# Patient Record
Sex: FEMALE | Race: BLACK OR AFRICAN AMERICAN | Employment: FULL TIME | URBAN - METROPOLITAN AREA
[De-identification: names, ages, dates, MRNs, and addresses within clinical notes are randomized per-mention and may not be internally consistent; named-entity substitution may affect disease eponyms.]

---

## 2017-02-20 ENCOUNTER — APPOINTMENT (OUTPATIENT)
Dept: GENERAL RADIOLOGY | Age: 52
End: 2017-02-20
Attending: EMERGENCY MEDICINE
Payer: COMMERCIAL

## 2017-02-20 ENCOUNTER — HOSPITAL ENCOUNTER (EMERGENCY)
Age: 52
Discharge: HOME | End: 2017-02-20
Attending: EMERGENCY MEDICINE
Payer: COMMERCIAL

## 2017-02-20 VITALS
SYSTOLIC BLOOD PRESSURE: 122 MMHG | RESPIRATION RATE: 18 BRPM | HEART RATE: 87 BPM | BODY MASS INDEX: 36.92 KG/M2 | DIASTOLIC BLOOD PRESSURE: 77 MMHG | OXYGEN SATURATION: 98 % | WEIGHT: 250 LBS | TEMPERATURE: 99.7 F

## 2017-02-20 DIAGNOSIS — J20.9 ACUTE BRONCHITIS, UNSPECIFIED ORGANISM: ICD-10-CM

## 2017-02-20 DIAGNOSIS — J06.9 ACUTE URI: Primary | ICD-10-CM

## 2017-02-20 DIAGNOSIS — R07.89 ACUTE CHEST WALL PAIN: ICD-10-CM

## 2017-02-20 LAB
ANION GAP BLD CALC-SCNC: 7 MMOL/L (ref 3–18)
ATRIAL RATE: 115 BPM
BASOPHILS # BLD AUTO: 0 K/UL (ref 0–0.1)
BASOPHILS # BLD: 0 % (ref 0–2)
BUN SERPL-MCNC: 7 MG/DL (ref 7–18)
BUN/CREAT SERPL: 8 (ref 12–20)
CALCIUM SERPL-MCNC: 9.2 MG/DL (ref 8.5–10.1)
CALCULATED P AXIS, ECG09: 49 DEGREES
CALCULATED R AXIS, ECG10: -41 DEGREES
CALCULATED T AXIS, ECG11: 46 DEGREES
CHLORIDE SERPL-SCNC: 98 MMOL/L (ref 100–108)
CO2 SERPL-SCNC: 30 MMOL/L (ref 21–32)
CREAT SERPL-MCNC: 0.84 MG/DL (ref 0.6–1.3)
DIAGNOSIS, 93000: NORMAL
DIFFERENTIAL METHOD BLD: ABNORMAL
EOSINOPHIL # BLD: 0 K/UL (ref 0–0.4)
EOSINOPHIL NFR BLD: 0 % (ref 0–5)
ERYTHROCYTE [DISTWIDTH] IN BLOOD BY AUTOMATED COUNT: 14 % (ref 11.6–14.5)
FLUAV AG NPH QL IA: NEGATIVE
FLUBV AG NOSE QL IA: NEGATIVE
GLUCOSE SERPL-MCNC: 123 MG/DL (ref 74–99)
HCT VFR BLD AUTO: 38.4 % (ref 35–45)
HGB BLD-MCNC: 12.5 G/DL (ref 12–16)
LYMPHOCYTES # BLD AUTO: 17 % (ref 21–52)
LYMPHOCYTES # BLD: 2.4 K/UL (ref 0.9–3.6)
MAGNESIUM SERPL-MCNC: 2 MG/DL (ref 1.8–2.4)
MCH RBC QN AUTO: 27.4 PG (ref 24–34)
MCHC RBC AUTO-ENTMCNC: 32.6 G/DL (ref 31–37)
MCV RBC AUTO: 84 FL (ref 74–97)
MONOCYTES # BLD: 0.9 K/UL (ref 0.05–1.2)
MONOCYTES NFR BLD AUTO: 6 % (ref 3–10)
NEUTS SEG # BLD: 11.2 K/UL (ref 1.8–8)
NEUTS SEG NFR BLD AUTO: 77 % (ref 40–73)
P-R INTERVAL, ECG05: 120 MS
PLATELET # BLD AUTO: 220 K/UL (ref 135–420)
PMV BLD AUTO: 10.9 FL (ref 9.2–11.8)
POTASSIUM SERPL-SCNC: 3.3 MMOL/L (ref 3.5–5.5)
Q-T INTERVAL, ECG07: 336 MS
QRS DURATION, ECG06: 80 MS
QTC CALCULATION (BEZET), ECG08: 464 MS
RBC # BLD AUTO: 4.57 M/UL (ref 4.2–5.3)
SODIUM SERPL-SCNC: 135 MMOL/L (ref 136–145)
VENTRICULAR RATE, ECG03: 115 BPM
WBC # BLD AUTO: 14.5 K/UL (ref 4.6–13.2)

## 2017-02-20 PROCEDURE — 80048 BASIC METABOLIC PNL TOTAL CA: CPT | Performed by: EMERGENCY MEDICINE

## 2017-02-20 PROCEDURE — 96361 HYDRATE IV INFUSION ADD-ON: CPT

## 2017-02-20 PROCEDURE — 74011250637 HC RX REV CODE- 250/637: Performed by: EMERGENCY MEDICINE

## 2017-02-20 PROCEDURE — 71020 XR CHEST PA LAT: CPT

## 2017-02-20 PROCEDURE — 93005 ELECTROCARDIOGRAM TRACING: CPT

## 2017-02-20 PROCEDURE — 96374 THER/PROPH/DIAG INJ IV PUSH: CPT

## 2017-02-20 PROCEDURE — 83735 ASSAY OF MAGNESIUM: CPT | Performed by: EMERGENCY MEDICINE

## 2017-02-20 PROCEDURE — 87804 INFLUENZA ASSAY W/OPTIC: CPT | Performed by: EMERGENCY MEDICINE

## 2017-02-20 PROCEDURE — 99284 EMERGENCY DEPT VISIT MOD MDM: CPT

## 2017-02-20 PROCEDURE — 74011250636 HC RX REV CODE- 250/636: Performed by: EMERGENCY MEDICINE

## 2017-02-20 PROCEDURE — 85025 COMPLETE CBC W/AUTO DIFF WBC: CPT | Performed by: EMERGENCY MEDICINE

## 2017-02-20 PROCEDURE — 87081 CULTURE SCREEN ONLY: CPT | Performed by: EMERGENCY MEDICINE

## 2017-02-20 RX ORDER — AZITHROMYCIN 250 MG/1
250 TABLET, FILM COATED ORAL SEE ADMIN INSTRUCTIONS
Qty: 6 TAB | Refills: 0 | Status: SHIPPED | OUTPATIENT
Start: 2017-02-20

## 2017-02-20 RX ORDER — KETOROLAC TROMETHAMINE 30 MG/ML
30 INJECTION, SOLUTION INTRAMUSCULAR; INTRAVENOUS
Status: COMPLETED | OUTPATIENT
Start: 2017-02-20 | End: 2017-02-20

## 2017-02-20 RX ORDER — CODEINE PHOSPHATE AND GUAIFENESIN 10; 100 MG/5ML; MG/5ML
5 SOLUTION ORAL
Qty: 120 ML | Refills: 0 | Status: SHIPPED | OUTPATIENT
Start: 2017-02-20

## 2017-02-20 RX ORDER — POTASSIUM CHLORIDE 20 MEQ/1
40 TABLET, EXTENDED RELEASE ORAL
Status: COMPLETED | OUTPATIENT
Start: 2017-02-20 | End: 2017-02-20

## 2017-02-20 RX ADMIN — SODIUM CHLORIDE 500 ML: 900 INJECTION, SOLUTION INTRAVENOUS at 10:33

## 2017-02-20 RX ADMIN — KETOROLAC TROMETHAMINE 30 MG: 30 INJECTION, SOLUTION INTRAMUSCULAR at 10:33

## 2017-02-20 RX ADMIN — POTASSIUM CHLORIDE 40 MEQ: 1500 TABLET, EXTENDED RELEASE ORAL at 12:10

## 2017-02-20 NOTE — LETTER
NOTIFICATION RETURN TO WORK / SCHOOL 
 
2/20/2017 1:08 PM 
 
Ms. Lynn Alas [address] [place] [address] To Whom It May Concern: 
 
Lynn Alas is currently under the care of [facility]. She will return to work/school on: 2/22/2017 If there are questions or concerns please have the patient contact our office.  
 
 
 
Sincerely, 
 
 
Tushar Shaffer RN

## 2017-02-20 NOTE — ED PROVIDER NOTES
HPI Comments: Pt with history of HTN, presents to ED with complaint of productive cough x 1 week with pleuritic anterior chest pain. She denies any fevers or chills, no abdominal pain, no nausea or vomiting, no diaphoresis. She has not discussed symptoms with her PMD yet. She denies any neck or back pain. No personal or significant family history of CAD. No known ill contacts. No other acute symptoms or complaints were noted. No past medical history on file. Past Surgical History:   Procedure Laterality Date    Hx hysterectomy       2007         No family history on file. Social History     Social History    Marital status:      Spouse name: N/A    Number of children: N/A    Years of education: N/A     Occupational History    Not on file. Social History Main Topics    Smoking status: Never Smoker    Smokeless tobacco: Not on file    Alcohol use No    Drug use: No    Sexual activity: Not on file     Other Topics Concern    Not on file     Social History Narrative    No narrative on file         ALLERGIES: Review of patient's allergies indicates no known allergies. Review of Systems   Constitutional: Negative for chills, diaphoresis and fever. HENT: Negative. Eyes: Negative for visual disturbance. Respiratory: Positive for cough and wheezing. Negative for shortness of breath. Cardiovascular: Positive for chest pain. Negative for palpitations and leg swelling. Gastrointestinal: Negative for abdominal pain, diarrhea, nausea and vomiting. Endocrine: Negative. Genitourinary: Negative for dysuria and hematuria. Musculoskeletal: Negative. Negative for back pain, neck pain and neck stiffness. Skin: Negative for pallor. Allergic/Immunologic: Negative. Neurological: Negative for dizziness, syncope, light-headedness and headaches. Hematological: Does not bruise/bleed easily.        Vitals:    02/20/17 0632   BP: 131/80   Pulse: (!) 115   Resp: 20   Temp: 99.7 °F (37.6 °C)   SpO2: 97%   Weight: 113.4 kg (250 lb)            Physical Exam   Constitutional: She is oriented to person, place, and time. She appears well-developed and well-nourished. No distress. Resting comfortably on stretcher   HENT:   Head: Normocephalic and atraumatic. MM moist   Eyes: Conjunctivae and EOM are normal. No scleral icterus. Sclera clear bilaterally   Neck: Normal range of motion. Neck supple. No JVD present. Non-tender to palpation   Cardiovascular: Normal rate, regular rhythm and normal heart sounds. Exam reveals no gallop and no friction rub. No murmur heard. Pulmonary/Chest: Effort normal and breath sounds normal. No respiratory distress. She has no wheezes. She has no rales. She exhibits tenderness. No crepitance with palpation. Reproducible chest wall tenderness with palpation along bilateral sternal borders. Abdominal: Soft. She exhibits no distension. There is no tenderness. Genitourinary:   Genitourinary Comments: No CVA tenderness   Musculoskeletal: She exhibits no edema or tenderness. Normal inspection of upper extremities. No edema noted to bilateral lower extremities   Lymphadenopathy:     She has no cervical adenopathy. Neurological: She is alert and oriented to person, place, and time. No cranial nerve deficit. She exhibits normal muscle tone. Normal motor and sensation bilaterally to upper and lower extremities   Skin: Skin is warm and dry. No rash noted. She is not diaphoretic. Psychiatric:   Normal mood and affect. Vitals reviewed. MDM  Number of Diagnoses or Management Options  Diagnosis management comments: Pt with reproducible chest wall pain with cough, no significant cardiac risk factors or history. Will check labs and XR, treat symptoms but anticipate discharge to home with PMD follow up.        Amount and/or Complexity of Data Reviewed  Clinical lab tests: ordered  Tests in the radiology section of CPT®: ordered  Tests in the medicine section of CPT®: ordered  Decide to obtain previous medical records or to obtain history from someone other than the patient: yes  Obtain history from someone other than the patient: yes  Independent visualization of images, tracings, or specimens: yes    Risk of Complications, Morbidity, and/or Mortality  Presenting problems: moderate  Management options: moderate      ED Course       Procedures    EKG:  NSR, rate 115, LAD, no significant ST-T abnormalities. No old EKG available for comparison.

## 2017-02-20 NOTE — ED NOTES
Assumed care for discharge instructions. Patient is awake/alert x 4 with quiet unlabored respirations, headache 5/10  Prescriptions x 2 and home care instructions were reviewed with patient who verbalized understanding.   Patient was then ambulatory to the exit with an even steady gait

## 2017-02-20 NOTE — DISCHARGE INSTRUCTIONS
Bronchitis: Care Instructions  Your Care Instructions    Bronchitis is inflammation of the bronchial tubes, which carry air to the lungs. The tubes swell and produce mucus, or phlegm. The mucus and inflamed bronchial tubes make you cough. You may have trouble breathing. Most cases of bronchitis are caused by viruses like those that cause colds. Antibiotics usually do not help and they may be harmful. Bronchitis usually develops rapidly and lasts about 2 to 3 weeks in otherwise healthy people. Follow-up care is a key part of your treatment and safety. Be sure to make and go to all appointments, and call your doctor if you are having problems. It's also a good idea to know your test results and keep a list of the medicines you take. How can you care for yourself at home? · Take all medicines exactly as prescribed. Call your doctor if you think you are having a problem with your medicine. · Get some extra rest.  · Take an over-the-counter pain medicine, such as acetaminophen (Tylenol), ibuprofen (Advil, Motrin), or naproxen (Aleve) to reduce fever and relieve body aches. Read and follow all instructions on the label. · Do not take two or more pain medicines at the same time unless the doctor told you to. Many pain medicines have acetaminophen, which is Tylenol. Too much acetaminophen (Tylenol) can be harmful. · Take an over-the-counter cough medicine that contains dextromethorphan to help quiet a dry, hacking cough so that you can sleep. Avoid cough medicines that have more than one active ingredient. Read and follow all instructions on the label. · Breathe moist air from a humidifier, hot shower, or sink filled with hot water. The heat and moisture will thin mucus so you can cough it out. · Do not smoke. Smoking can make bronchitis worse. If you need help quitting, talk to your doctor about stop-smoking programs and medicines. These can increase your chances of quitting for good.   When should you call for help? Call 911 anytime you think you may need emergency care. For example, call if:  · You have severe trouble breathing. Call your doctor now or seek immediate medical care if:  · You have new or worse trouble breathing. · You cough up dark brown or bloody mucus (sputum). · You have a new or higher fever. · You have a new rash. Watch closely for changes in your health, and be sure to contact your doctor if:  · You cough more deeply or more often, especially if you notice more mucus or a change in the color of your mucus. · You are not getting better as expected. Where can you learn more? Go to http://humera-ryder.info/. Enter H333 in the search box to learn more about \"Bronchitis: Care Instructions. \"  Current as of: May 23, 2016  Content Version: 11.1  © 2251-2226 [employer]. Care instructions adapted under license by [employer] (which disclaims liability or warranty for this information). If you have questions about a medical condition or this instruction, always ask your healthcare professional. [place] [address] any warranty or liability for your use of this information. Musculoskeletal Chest Pain: Care Instructions  Your Care Instructions  Chest pain is not always a sign that something is wrong with your heart or that you have another serious problem. The doctor thinks your chest pain is caused by strained muscles or ligaments, inflamed chest cartilage, or another problem in your chest, rather than by your heart. You may need more tests to find the cause of your chest pain. Follow-up care is a key part of your treatment and safety. Be sure to make and go to all appointments, and call your doctor if you are having problems. Its also a good idea to know your test results and keep a list of the medicines you take. How can you care for yourself at home? · Take pain medicines exactly as directed.   ¨ If the doctor gave you a prescription medicine for pain, take it as prescribed. ¨ If you are not taking a prescription pain medicine, ask your doctor if you can take an over-the-counter medicine. · Rest and protect the sore area. · Stop, change, or take a break from any activity that may be causing your pain or soreness. · Put ice or a cold pack on the sore area for 10 to 20 minutes at a time. Try to do this every 1 to 2 hours for the next 3 days (when you are awake) or until the swelling goes down. Put a thin cloth between the ice and your skin. · After 2 or 3 days, apply a heating pad set on low or a warm cloth to the area that hurts. Some doctors suggest that you go back and forth between hot and cold. · Do not wrap or tape your ribs for support. This may cause you to take smaller breaths, which could increase your risk of lung problems. · Mentholated creams such as Bengay or Icy Hot may soothe sore muscles. Follow the instructions on the package. · Follow your doctor's instructions for exercising. · Gentle stretching and massage may help you get better faster. Stretch slowly to the point just before pain begins, and hold the stretch for at least 15 to 30 seconds. Do this 3 or 4 times a day. Stretch just after you have applied heat. · As your pain gets better, slowly return to your normal activities. Any increased pain may be a sign that you need to rest a while longer. When should you call for help? Call 911 anytime you think you may need emergency care. For example, call if:  · You have chest pain or pressure. This may occur with:  ¨ Sweating. ¨ Shortness of breath. ¨ Nausea or vomiting. ¨ Pain that spreads from the chest to the neck, jaw, or one or both shoulders or arms. ¨ Dizziness or lightheadedness. ¨ A fast or uneven pulse. After calling 911, chew 1 adult-strength aspirin. Wait for an ambulance. Do not try to drive yourself. · You have sudden chest pain and shortness of breath, or you cough up blood.   Call your doctor now or seek immediate medical care if:  · You have any trouble breathing. · Your chest pain gets worse. · Your chest pain occurs consistently with exercise and is relieved by rest.  Watch closely for changes in your health, and be sure to contact your doctor if:  · Your chest pain does not get better after 1 week. Where can you learn more? Go to http://humera-ryder.info/. Enter V293 in the search box to learn more about \"Musculoskeletal Chest Pain: Care Instructions. \"  Current as of: May 27, 2016  Content Version: 11.1  © 1041-3783 [employer]. Care instructions adapted under license by [employer] (which disclaims liability or warranty for this information). If you have questions about a medical condition or this instruction, always ask your healthcare professional. [place] [address] any warranty or liability for your use of this information. Upper Respiratory Infection (Cold): Care Instructions  Your Care Instructions    An upper respiratory infection, or URI, is an infection of the nose, sinuses, or throat. URIs are spread by coughs, sneezes, and direct contact. The common cold is the most frequent kind of URI. The flu and sinus infections are other kinds of URIs. Almost all URIs are caused by viruses. Antibiotics won't cure them. But you can treat most infections with home care. This may include drinking lots of fluids and taking over-the-counter pain medicine. You will probably feel better in 4 to 10 days. The doctor has checked you carefully, but problems can develop later. If you notice any problems or new symptoms, get medical treatment right away. Follow-up care is a key part of your treatment and safety. Be sure to make and go to all appointments, and call your doctor if you are having problems. It's also a good idea to know your test results and keep a list of the medicines you take.   How can you care for yourself at home?  · To prevent dehydration, drink plenty of fluids, enough so that your urine is light yellow or clear like water. Choose water and other caffeine-free clear liquids until you feel better. If you have kidney, heart, or liver disease and have to limit fluids, talk with your doctor before you increase the amount of fluids you drink. · Take an over-the-counter pain medicine, such as acetaminophen (Tylenol), ibuprofen (Advil, Motrin), or naproxen (Aleve). Read and follow all instructions on the label. · Before you use cough and cold medicines, check the label. These medicines may not be safe for young children or for people with certain health problems. · Be careful when taking over-the-counter cold or flu medicines and Tylenol at the same time. Many of these medicines have acetaminophen, which is Tylenol. Read the labels to make sure that you are not taking more than the recommended dose. Too much acetaminophen (Tylenol) can be harmful. · Get plenty of rest.  · Do not smoke or allow others to smoke around you. If you need help quitting, talk to your doctor about stop-smoking programs and medicines. These can increase your chances of quitting for good. When should you call for help? Call 911 anytime you think you may need emergency care. For example, call if:  · You have severe trouble breathing. Call your doctor now or seek immediate medical care if:  · You seem to be getting much sicker. · You have new or worse trouble breathing. · You have a new or higher fever. · You have a new rash. Watch closely for changes in your health, and be sure to contact your doctor if:  · You have a new symptom, such as a sore throat, an earache, or sinus pain. · You cough more deeply or more often, especially if you notice more mucus or a change in the color of your mucus. · You do not get better as expected. Where can you learn more? Go to http://humera-ryder.info/.   Enter B969 in the search box to learn more about \"Upper Respiratory Infection (Cold): Care Instructions. \"  Current as of: June 30, 2016  Content Version: 11.1  © 5967-3007 [employer]. Care instructions adapted under license by [employer] (which disclaims liability or warranty for this information). If you have questions about a medical condition or this instruction, always ask your healthcare professional. [place] [address] any warranty or liability for your use of this information.

## 2017-02-23 LAB
B-HEM STREP THROAT QL CULT: NEGATIVE
BACTERIA SPEC CULT: NORMAL
SERVICE CMNT-IMP: NORMAL

## 2017-03-14 ENCOUNTER — HOSPITAL ENCOUNTER (OUTPATIENT)
Dept: MAMMOGRAPHY | Age: 52
Discharge: HOME | End: 2017-03-14
Attending: OBSTETRICS & GYNECOLOGY
Payer: COMMERCIAL

## 2017-03-14 DIAGNOSIS — Z12.31 VISIT FOR SCREENING MAMMOGRAM: ICD-10-CM

## 2017-03-14 PROCEDURE — 77067 SCR MAMMO BI INCL CAD: CPT

## 2017-09-23 ENCOUNTER — HOSPITAL ENCOUNTER (OUTPATIENT)
Dept: LAB | Age: 52
Discharge: HOME | End: 2017-09-23
Payer: COMMERCIAL

## 2017-09-23 LAB
25(OH)D3 SERPL-MCNC: 16.1 NG/ML (ref 30–100)
ALBUMIN SERPL-MCNC: 3.5 G/DL (ref 3.4–5)
ALBUMIN/GLOB SERPL: 1 {RATIO} (ref 0.8–1.7)
ALP SERPL-CCNC: 56 U/L (ref 45–117)
ALT SERPL-CCNC: 20 U/L (ref 13–56)
ANION GAP SERPL CALC-SCNC: 7 MMOL/L (ref 3–18)
AST SERPL-CCNC: 15 U/L (ref 15–37)
BASOPHILS # BLD: 0 K/UL (ref 0–0.06)
BASOPHILS NFR BLD: 1 % (ref 0–2)
BILIRUB SERPL-MCNC: 0.4 MG/DL (ref 0.2–1)
BUN SERPL-MCNC: 10 MG/DL (ref 7–18)
BUN/CREAT SERPL: 12 (ref 12–20)
CALCIUM SERPL-MCNC: 9.3 MG/DL (ref 8.5–10.1)
CHLORIDE SERPL-SCNC: 106 MMOL/L (ref 100–108)
CHOLEST SERPL-MCNC: 188 MG/DL
CO2 SERPL-SCNC: 28 MMOL/L (ref 21–32)
CREAT SERPL-MCNC: 0.83 MG/DL (ref 0.6–1.3)
CREAT UR-MCNC: 335 MG/DL (ref 30–125)
DIFFERENTIAL METHOD BLD: ABNORMAL
EOSINOPHIL # BLD: 0.6 K/UL (ref 0–0.4)
EOSINOPHIL NFR BLD: 9 % (ref 0–5)
ERYTHROCYTE [DISTWIDTH] IN BLOOD BY AUTOMATED COUNT: 13.5 % (ref 11.6–14.5)
EST. AVERAGE GLUCOSE BLD GHB EST-MCNC: 140 MG/DL
GLOBULIN SER CALC-MCNC: 3.6 G/DL (ref 2–4)
GLUCOSE SERPL-MCNC: 102 MG/DL (ref 74–99)
HBA1C MFR BLD: 6.5 % (ref 4.2–5.6)
HCT VFR BLD AUTO: 39.3 % (ref 35–45)
HDLC SERPL-MCNC: 43 MG/DL (ref 40–60)
HDLC SERPL: 4.4 {RATIO} (ref 0–5)
HGB BLD-MCNC: 12.7 G/DL (ref 12–16)
LDLC SERPL CALC-MCNC: 124.6 MG/DL (ref 0–100)
LIPID PROFILE,FLP: ABNORMAL
LYMPHOCYTES # BLD: 2.2 K/UL (ref 0.9–3.6)
LYMPHOCYTES NFR BLD: 34 % (ref 21–52)
MCH RBC QN AUTO: 27.1 PG (ref 24–34)
MCHC RBC AUTO-ENTMCNC: 32.3 G/DL (ref 31–37)
MCV RBC AUTO: 83.8 FL (ref 74–97)
MICROALBUMIN UR-MCNC: 1.66 MG/DL (ref 0–3)
MICROALBUMIN/CREAT UR-RTO: 5 MG/G (ref 0–30)
MONOCYTES # BLD: 0.3 K/UL (ref 0.05–1.2)
MONOCYTES NFR BLD: 5 % (ref 3–10)
NEUTS SEG # BLD: 3.4 K/UL (ref 1.8–8)
NEUTS SEG NFR BLD: 51 % (ref 40–73)
PLATELET # BLD AUTO: 230 K/UL (ref 135–420)
PMV BLD AUTO: 11.2 FL (ref 9.2–11.8)
POTASSIUM SERPL-SCNC: 3.8 MMOL/L (ref 3.5–5.5)
PROT SERPL-MCNC: 7.1 G/DL (ref 6.4–8.2)
RBC # BLD AUTO: 4.69 M/UL (ref 4.2–5.3)
SODIUM SERPL-SCNC: 141 MMOL/L (ref 136–145)
TRIGL SERPL-MCNC: 102 MG/DL (ref ?–150)
TSH W FREE THYROID I,TSHELE: 0.62 UIU/ML (ref 0.36–3.74)
VLDLC SERPL CALC-MCNC: 20.4 MG/DL
WBC # BLD AUTO: 6.5 K/UL (ref 4.6–13.2)

## 2017-09-23 PROCEDURE — 83036 HEMOGLOBIN GLYCOSYLATED A1C: CPT | Performed by: NURSE PRACTITIONER

## 2017-09-23 PROCEDURE — 84443 ASSAY THYROID STIM HORMONE: CPT | Performed by: NURSE PRACTITIONER

## 2017-09-23 PROCEDURE — 80053 COMPREHEN METABOLIC PANEL: CPT | Performed by: NURSE PRACTITIONER

## 2017-09-23 PROCEDURE — 85025 COMPLETE CBC W/AUTO DIFF WBC: CPT | Performed by: NURSE PRACTITIONER

## 2017-09-23 PROCEDURE — 80061 LIPID PANEL: CPT | Performed by: NURSE PRACTITIONER

## 2017-09-23 PROCEDURE — 82043 UR ALBUMIN QUANTITATIVE: CPT | Performed by: NURSE PRACTITIONER

## 2017-09-23 PROCEDURE — 86803 HEPATITIS C AB TEST: CPT | Performed by: NURSE PRACTITIONER

## 2017-09-23 PROCEDURE — 36415 COLL VENOUS BLD VENIPUNCTURE: CPT | Performed by: NURSE PRACTITIONER

## 2017-09-23 PROCEDURE — 82306 VITAMIN D 25 HYDROXY: CPT | Performed by: NURSE PRACTITIONER

## 2017-09-25 LAB
COMMENT, 144067: NORMAL
HCV AB S/CO SERPL IA: <0.1 S/CO RATIO (ref 0–0.9)

## 2017-11-27 ENCOUNTER — HOSPITAL ENCOUNTER (OUTPATIENT)
Dept: NUTRITION | Age: 52
Discharge: HOME | End: 2017-11-27
Payer: COMMERCIAL

## 2017-11-27 PROCEDURE — 97802 MEDICAL NUTRITION INDIV IN: CPT

## 2017-11-27 NOTE — PROGRESS NOTES
[facility]  [address], [address] [facility], [address] [address],[place]  Phone: [phone]  Fax: [phone]   Nutrition Assessment - Medical Nutrition Therapy   Outpatient Initial Evaluation         Patient Name: Gabriel Simon : 1965   Treatment Diagnosis: Diabetes   Referral Source: Bart Mays MD [facility]): 2017     Gender: female Age: 46 y.o. Ht: 69 in Wt: 268.4  lb  kg   BMI: 39.7 BMR   Male  BMR Female    Anthropometrics Assessment: Moderate abdominal adiposity is evident based on visual observation     Past Medical History includes: Family history of diabetes on paternal side. Pertinent Medications:   Metformin     Biochemical Data:   Lab Results   Component Value Date/Time    Hemoglobin A1c 6.5 2017 10:30 AM     Lab Results   Component Value Date/Time    Cholesterol, total 188 2017 10:30 AM    HDL Cholesterol 43 2017 10:30 AM    LDL, calculated 124.6 2017 10:30 AM    VLDL, calculated 20.4 2017 10:30 AM    Triglyceride 102 2017 10:30 AM    CHOL/HDL Ratio 4.4 2017 10:30 AM     Lab Results   Component Value Date/Time    ALT (SGPT) 20 2017 10:30 AM    AST (SGOT) 15 2017 10:30 AM    Alk. phosphatase 56 2017 10:30 AM    Bilirubin, total 0.4 2017 10:30 AM     Lab Results   Component Value Date/Time    Creatinine 0.83 2017 10:30 AM     Lab Results   Component Value Date/Time    BUN 10 2017 10:30 AM     Lab Results   Component Value Date/Time    Microalbumin/Creat ratio (mg/g creat) 5 2017 10:30 AM    Microalbumin,urine random 1.66 2017 10:30 AM        Subjective/Assessment:   Pt was pleasant and engaged during assessment. She did not admit to having a DM diagnosis and was focused on prevention. She has an appointment next week with her PCP. Pt works FT at the [employer], lives with 3year old niece. She is the primary cook and rarely eats out.  She has started bringing her lunch to work. Over the past year, she has been trying to eat healthier, stating her weight has always been a lora for her. She is motivated to make changes to her lifestyle to prevent DM complications. Current Eating Patterns: Pt has cut out bread, chips, and fruit juice. She would like to limit her red meat consumption. Pt states she is lactose-intolerant. Dietary recall: Breakfast 5 AM (fruit, peanut butter, Belvita), 10:30 AM (stuffing, turkey w/ gravy, henok greens, roll, water), 1:00 PM (scrambled eggs, sausage, pancake, water ), 7 PM (sweet tea). Pt tries not to eat after 7 PM, bed at 9 PM.      Estimate Needs   Calories: 1400  Protein: 137 Carbs: 103 Fat: 46   Kcal/day  g/day  g/day  g/day        percent: 40  30  30               Education & Recommendations provided: Educated pt on the pathophysiology of Type II Diabetes, insulin resistance and the rationale for dietary modifications and increased activity. Educated pt on carbohydrate food sources, counting carbohydrates, meal timing, and appropriate serving sizes. Handouts Provided: [x]  Carbohydrates  [x]  Protein  []  Fiber  []  Serving Sizes  [x]  Meal and Snack Ideas  []  Food Journals [x]  Diabetes  []  Cholesterol  []  Sodium  [x]  Gen Nutr Guidelines  []  SBGM Guidelines  []  Others:   Information Reviewed with: Pt, 3year old niece was present.     Readiness to Change Stage: []  Pre-contemplative    []  Contemplative  []  Preparation               [x]  Action                  []  Maintenance   Potential Barriers to Learning: []  Decline in memory    []  Language barrier   []  Other:  []  Emotional                  []  Limited mobility  []  Lack of motivation     [] Vision, hearing or cognitive impairment   Expected Compliance: Good      Nutritional Goal - To promote lifestyle changes to result in:    [x]  Weight loss  [x]  Improved diabetic control  []  Decreased cholesterol levels  []  Decreased blood pressure  []  Weight maintenance []  Preventing any interactions associated with food allergies  []  Adequate weight gain toward goal weight  []  Other:        Patient Goals:  SMART goals 1. Follow consistent and appropriate meal timing; follow a structured timeline  2. Focus on good sources of protein; Never eat carbs alone, always pair them with protein, Carb Limits: 30-40 per meal   3.  Use the plate method for portion contol and balance     Dietitian Signature: Raj Zelaya RDN Date: 11/27/2017   Follow-up: 12.20.17 at 1100 Time: 10:22 AM

## 2018-02-21 ENCOUNTER — HOSPITAL ENCOUNTER (OUTPATIENT)
Dept: PHYSICAL THERAPY | Age: 53
Discharge: HOME | End: 2018-02-21
Payer: COMMERCIAL

## 2018-02-21 PROCEDURE — 97161 PT EVAL LOW COMPLEX 20 MIN: CPT

## 2018-02-21 PROCEDURE — 97110 THERAPEUTIC EXERCISES: CPT

## 2018-02-21 NOTE — PROGRESS NOTES
[facility] - [facility] 85  [address] [facility], [address] [phone] fax    Plan of Care/ Statement of Necessity for Physical Therapy Services  Patient name: Nubia Purdy Start of Care: 2018   Referral source: Vince Harkinsma : 1965    Medical Diagnosis: Pain in right knee [M25.561]   Onset Date: 1 month ago    Treatment Diagnosis: right knee pain   Prior Hospitalization: see medical history Provider#: [id number]   Medications: Verified on Patient summary List    Comorbidities: HTN   Prior Level of Function: Independent with ADLs, functional and work tasks with no pain in the right knee. The Plan of Care and following information is based on the information from the initial evaluation. Assessment/ key information:   Pt is a 46year old female who presents to therapy today with right knee pain. Pt states that her symptoms began about 1 month ago from [facility] and tear\" from work. Pt reports slipping and falling at work about a week ago but has no increases in pain from this fall. Pt states she has increased pain with her right knee at a 90 deg angle in sitting, pain with stairs/walking, pain with sleeping, and her right knee gives out on her at times. Pt demonstrated decreased AROM, decreased strength, muscle tightness, impaired squat mechanics. Negative varus/valgus at 30/0 degs, anterior/posterior drawer tests, and Mcmurrary tests are noted on the right knee. Pt states she has an appointment with the orthopedic MD tomorrow 2018. Pt would benefit from physical therapy to improve the above impairments to help the pt return to performing ADLs, functional, and work activities.      Evaluation Complexity History LOW Complexity : Zero comorbidities / personal factors that will impact the outcome / POC; Examination MEDIUM Complexity : 3 Standardized tests and measures addressing body structure, function, activity limitation and / or participation in recreation  ;Presentation LOW Complexity : Stable, uncomplicated  ;Clinical Decision Making MEDIUM Complexity : FOTO score of 26-74  Overall Complexity Rating: LOW   Problem List: pain affecting function, decrease ROM, decrease strength, impaired gait/ balance, decrease ADL/ functional abilitiies, decrease activity tolerance, decrease flexibility/ joint mobility and decrease transfer abilities   Treatment Plan may include any combination of the following: Therapeutic exercise, Therapeutic activities, Neuromuscular re-education, Physical agent/modality, Gait/balance training, Manual therapy, Patient education, Self Care training, Functional mobility training, Home safety training and Stair training  Patient / Family readiness to learn indicated by: asking questions, trying to perform skills and interest  Persons(s) to be included in education: patient (P)  Barriers to Learning/Limitations: None  Patient Goal (s): comfort  Patient Self Reported Health Status: good  Rehabilitation Potential: good    Short Term Goals: To be accomplished in 2 weeks:  1. Pt will report compliance and independence to [facility] to help the pt manage their pain and symptoms. Long Term Goals: To be accomplished in 5 weeks:  1. Pt will increase FOTO score to 66 points to improve ability to perform ADLs. 2. Pt will increase MMT right knee EXT to 4/5, knee flex to 4+/5, hip flex/ABD to 4/5 to improve ability to tolerate ADLs. 3. Pt will increase AROM right knee flex to 115 degs to improve ability to bend her knee with less pain when sitting. 4. Pt will report being able to perform a sit to stand with minimal to no increased pain in her left knee to improve transfer ability at work. 5. Pt will perform a squat with correct mechanics with minimal to no cueing from there therapist to improve ease of mobility at work. Frequency / Duration: Patient to be seen 2 times per week for 5 weeks.     Patient/ Caregiver education and instruction: Diagnosis, prognosis, self care, activity modification and exercises   [x]  Plan of care has been reviewed with RAIN Khan, PT 2/21/2018 5:48 PM  _____________________________________________________________________  I certify that the above Therapy Services are being furnished while the patient is under my care. I agree with the treatment plan and certify that this therapy is necessary.     Physician's Signature:____________________  Date:__________Time:______    Please sign and return to [facility] - [place] [address]  [address] [place], [address] [phone] fax

## 2018-02-21 NOTE — PROGRESS NOTES
PT DAILY TREATMENT NOTE - [facility]     Patient Name: Sanaz Ozuna  Date:2018  : 1965  [x]  Patient  Verified  Payor: Martha Dolan / Plan: [address] [place] [address] / Product Type: PPO /    In time:5:04  Out time:5:40  Total Treatment Time (min): 36  Visit #: 1 of 10    Treatment Area: Pain in right knee [M25.561]    SUBJECTIVE  Pain Level (0-10 scale): 8  Any medication changes, allergies to medications, adverse drug reactions, diagnosis change, or new procedure performed?: [x] No    [] Yes (see summary sheet for update)  Subjective functional status/changes:   [] No changes reported  See POC    OBJECTIVE    26 min [x]Eval                  []Re-Eval     10 min Therapeutic Exercise:  [] See flow sheet : HEP instruction and demonstration, pt education regarding anatomy and physiology of the LEs and how it relates to the pt's condition. Rationale: increase ROM and increase strength to improve the patients ability to tolerate ADLs          With   [] TE   [] TA   [] neuro   [] other: Patient Education: [x] Review HEP    [] Progressed/Changed HEP based on:   [] positioning   [] body mechanics   [] transfers   [] heat/ice application    [] other:      Other Objective/Functional Measures: See evaluation. Pain Level (0-10 scale) post treatment: 0    ASSESSMENT/Changes in Function: Pt given HEP handout to perform. Pt understood exercises in HEP handout. Pt demonstrated decreased AROM, decreased strength, muscle tightness, impaired squat mechanics. Negative varus/valgus at 30/0 degs, anterior/posterior drawer tests, and Mcmurrary tests are noted on the right knee. Pt states she has an appointment with the orthopedic MD tomorrow 2018. Pt would benefit from physical therapy to improve the above impairments to help the pt return to performing ADLs, functional, and work activities.      Patient will continue to benefit from skilled PT services to modify and progress therapeutic interventions, address functional mobility deficits, address ROM deficits, address strength deficits, analyze and address soft tissue restrictions, analyze and cue movement patterns, analyze and modify body mechanics/ergonomics, address imbalance/dizziness and instruct in home and community integration to attain remaining goals. [x]  See Plan of Care  []  See progress note/recertification  []  See Discharge Summary         Progress towards goals / Updated goals:  Short Term Goals: To be accomplished in 2 weeks:  1. Pt will report compliance and independence to HEP to help the pt manage their pain and symptoms. Eval: Established                   Long Term Goals: To be accomplished in 5 weeks:  1. Pt will increase FOTO score to 66 points to improve ability to perform ADLs. Eval: 47 points  2. Pt will increase MMT right knee EXT to 4/5, knee flex to 4+/5, hip flex/ABD to 4/5 to improve ability to tolerate ADLs. Eval:  right knee EXT 3+/5, knee flex 4/5, hip flex 3+/5, ABD 4-/5  3. Pt will increase AROM right knee flex to 115 degs to improve ability to bend her knee with less pain when sitting. Eval: 103 degs with pain at end range  4. Pt will report being able to perform a sit to stand with minimal to no increased pain in her left knee to improve transfer ability at work. Eval: reports pain with sit to stand and uses UEs to help initiate transfer  5. Pt will perform a squat with correct mechanics with minimal to no cueing from there therapist to improve ease of mobility at work. Eval: increased weight on the left LE, with increased knee flex and minimal glute activation noted.      PLAN  [x]  Upgrade activities as tolerated     [x]  Continue plan of care  [x]  Update interventions per flow sheet       []  Discharge due to:_  []  Other:_      Milton Roy PT 2/21/2018  5:51 PM    Future Appointments  Date Time Provider Larissa Noble   2/21/2018 5:00 PM Milton Roy PT [facility]   2/22/2018 10:[place] [id number] [facility]   3/15/2018 4:00 PM [facility] CAROLYN RM 1 [facility]

## 2018-02-22 ENCOUNTER — OFFICE VISIT (OUTPATIENT)
Dept: ORTHOPEDIC SURGERY | Age: 53
End: 2018-02-22

## 2018-02-22 VITALS
TEMPERATURE: 97.5 F | HEIGHT: 69 IN | WEIGHT: 274.8 LBS | HEART RATE: 65 BPM | OXYGEN SATURATION: 97 % | BODY MASS INDEX: 40.7 KG/M2 | DIASTOLIC BLOOD PRESSURE: 89 MMHG | SYSTOLIC BLOOD PRESSURE: 137 MMHG

## 2018-02-22 DIAGNOSIS — M51.9 LUMBAR DISC DISEASE: ICD-10-CM

## 2018-02-22 DIAGNOSIS — M17.11 ARTHRITIS OF KNEE, RIGHT: ICD-10-CM

## 2018-02-22 DIAGNOSIS — M25.561 RIGHT KNEE PAIN, UNSPECIFIED CHRONICITY: Primary | ICD-10-CM

## 2018-02-22 PROBLEM — E66.01 OBESITY, MORBID (HCC): Status: ACTIVE | Noted: 2018-02-22

## 2018-02-22 RX ORDER — HYDROCHLOROTHIAZIDE 12.5 MG/1
12.5 CAPSULE ORAL DAILY
COMMUNITY

## 2018-02-22 RX ORDER — DICLOFENAC SODIUM 100 MG/1
100 TABLET, FILM COATED, EXTENDED RELEASE ORAL DAILY
Qty: 30 TAB | Refills: 1 | Status: SHIPPED | OUTPATIENT
Start: 2018-02-22 | End: 2018-06-11 | Stop reason: SDUPTHER

## 2018-02-22 NOTE — MR AVS SNAPSHOT
[address] [place] [address], [address] [place] 
[phone] Patient: Dalia Myers MRN: [id number] :1965 Visit Information Date & Time Provider Department Dept. Phone Encounter #  
 2018 10:15 AM Gracia Kasper  [facility], [address] [facility] [phone] [id number] Upcoming Health Maintenance Date Due DTaP/Tdap/Td series (1 - Tdap) 1986 PAP AKA CERVICAL CYTOLOGY 1986 FOBT Q 1 YEAR AGE 50-75 2015 Influenza Age 5 to Adult 2017 BREAST CANCER SCRN MAMMOGRAM 3/14/2019 Allergies as of 2018  Review Complete On: 2018 By: Gracia Kasper MD  
  
 Severity Noted Reaction Type Reactions Tramadol High 2018   Side Effect Other (comments) Prednisone  2018    Shortness of Breath Current Immunizations  Never Reviewed No immunizations on file. Not reviewed this visit You Were Diagnosed With   
  
 Codes Comments Right knee pain, unspecified chronicity    -  Primary ICD-10-CM: M25.561 ICD-9-CM: 719.46 Arthritis of knee, right     ICD-10-CM: M17.11 ICD-9-CM: 716.96 Lumbar disc disease     ICD-10-CM: M51.9 ICD-9-CM: 722.93 Vitals BP Pulse Temp Height(growth percentile) Weight(growth percentile) SpO2  
 137/89 65 97.5 °F (36.4 °C) 5' 9\" (1.753 m) 274 lb 12.8 oz (124.6 kg) 97% BMI Smoking Status 40.58 kg/m2 Never Smoker BMI and BSA Data Body Mass Index Body Surface Area 40.58 kg/m 2 2.46 m 2 Preferred Pharmacy Pharmacy Name Phone [address], [address] Your Updated Medication List  
  
   
This list is accurate as of 18 11:20 AM.  Always use your most recent med list.  
  
  
  
  
 azithromycin 250 mg tablet Commonly known as:  Thomas Anthony Take 1 Tab by mouth See Admin Instructions. Take 2 tablets PO on day 1 then 1 tab PO every day for days 2-5  
  
 diclofenac 100 mg ER tablet Commonly known as:  VOLTAREN XR Take 1 Tab by mouth daily. guaiFENesin-codeine 100-10 mg/5 mL solution Commonly known as:  [place]ina [place] Take 5 mL by mouth three (3) times daily as needed for Cough. Max Daily Amount: 15 mL.  
  
 hydroCHLOROthiazide 12.5 mg capsule Commonly known as:  Arleta Rife Take 12.5 mg by mouth daily. ondansetron hcl 4 mg tablet Commonly known as:  ZOFRAN (AS HYDROCHLORIDE) Take 1 Tab by mouth every eight (8) hours as needed for Nausea. Prescriptions Sent to Pharmacy Refills  
 diclofenac (VOLTAREN XR) 100 mg ER tablet 1 Sig: Take 1 Tab by mouth daily. Class: Normal  
 Pharmacy: [address] [place] [address] #: [phone] Route: Oral  
  
We Performed the Following AMB POC XRAY, KNEE; 1/2 VIEWS [66537 CPT(R)] To-Do List   
 02/27/2018 4:00 PM  
  Appointment with Chloé Barba PT at [facility] PT [address] [place] [address] ([phone])  
  
 03/15/2018 4:00 PM  
  Appointment with KATHIE LEON  1 at [address] [place] [address] ([phone]) OUTSIDE FILMS  - Any outside films related to the study being scheduled should be brought with you on the day of the exam.  If this cannot be done there may be a delay in the reading of the study. MEDICATIONS  - Patient must bring a complete list of all medications currently taking to include prescriptions, over-the-counter meds, herbals, vitamins & any dietary supplements  GENERAL INSTRUCTIONS  - On the day of your exam do not use any bath powder, deodorant or lotions on the armpit area. -Tenderness of breasts may cause an increase of discomfort during procedure. If you are experiencing breast tenderness on the day of your appointment and would like to reschedule, please call [phone]. Introducing [facility]! Josemanuel Borden introduces The [employer]box patient portal. Now you can access parts of your medical record, email your doctor's office, and request medication refills online. 1. In your internet browser, go to [email or web address] 2. Click on the First Time User? Click Here link in the Sign In box. You will see the New Member Sign Up page. 3. Enter your The Glassbox Access Code exactly as it appears below. You will not need to use this code after youve completed the sign-up process. If you do not sign up before the expiration date, you must request a new code. · The Glassbox Access Code: [email or web address] Expires: 3/17/2018 12:53 PM 
 
4. Enter the last four digits of your Social Security Number (xxxx) and Date of Birth (mm/dd/yyyy) as indicated and click Submit. You will be taken to the next sign-up page. 5. Create a The Glassbox ID. This will be your The Glassbox login ID and cannot be changed, so think of one that is secure and easy to remember. 6. Create a The [employer] password. You can change your password at any time. 7. Enter your Password Reset Question and Answer. This can be used at a later time if you forget your password. 8. Enter your e-mail address. You will receive e-mail notification when new information is available in [address] 9. Click Sign Up. You can now view and download portions of your medical record. 10. Click the Download Summary menu link to download a portable copy of your medical information. If you have questions, please visit the Frequently Asked Questions section of the The [employer] website. Remember, The Glassbox is NOT to be used for urgent needs. For medical emergencies, dial 911. Now available from your iPhone and Android! Please provide this summary of care documentation to your next provider. Your primary care clinician is listed as Lexa Sanchez. If you have any questions after today's visit, please call [phone].

## 2018-02-22 NOTE — PROGRESS NOTES
HISTORY OF PRESENT ILLNESS: Ms. Chaparro Strange is here for consultation regarding right knee and leg pain. She has had symptoms of pain in her right knee for about the past four to six weeks, but there is no history of trauma to the right knee. She states it bothers her at night, as well as during the day but it is aggravated by weightbearing activities. She occasionally notes pain that radiates up the right thigh as well as below the right knee. She denies numbness or tingling in the lower extremities. She denies bladder dysfunction. She does not have a history of back pain. She did take some over-the-counter anti-inflammatory medications which did not really help her. Her family physician saw her and was going to put her on a Medrol Dosepak but she did not want to take this. They then gave her a prescription for a muscle relaxer and some Tramadol but that did not really help and she did not like the effects of those medications. She has not noticed significant swelling of her right knee. She does not describe mechanical locking symptoms of her right knee. She currently does not utilize any ambulatory assist.    PHYSICAL EXAMINATION:  Reveals an overweight, 55-year-old, -American female in mild discomfort. She is alert and oriented x 3 and answers questions appropriately. With reference to the right knee, she does not have a significant effusion of the right knee or the left knee. With reference to the right knee she has slight palpable medial joint line tenderness. Miguel As test is negative. Lachman test is negative. She has good collateral, as well as cruciate ligament stability to her right knee. Anterior and posterior drawer tests are negative. She has a good range of motion of the right knee from full extension to flexion of about 120º. Flexion beyond this is limited secondary to her obesity. She has no right calf tenderness or swelling.   Neurovascular testing is intact to the right foot distally. With reference to the right hip, she has a good passive range of motion of her right hip without discomfort. Straight leg raise test is negative bilaterally. RADIOGRAPHS:  X-rays of the right knee reveal mild degenerative changes of the right knee. She still has adequate joint space remaining in the weightbearing portion of both knees. There is no acute osseus pathology. IMPRESSION:   1. Mild osteoarthritis right knee. 2. Probable lumbar disc disease by history. RECOMMENDATIONS:  Treatment at this point remains symptomatic and conservative. She is already doing some physical therapy for her knee and I certainly agree with this. I will start her on Voltaren 100 mg po qd with food and then check her back in about one month to see if this is helping her. If it is we will try and minimize the use of this. I have also discussed with her the importance of weight reduction. She needs to lose the weight in order to preserve this natural knee as long as possible. All of her questions were answered today. She will return to see me in about four weeks. Vitals:    02/22/18 1038   BP: 137/89   Pulse: 65   Temp: 97.5 °F (36.4 °C)   SpO2: 97%   Weight: 274 lb 12.8 oz (124.6 kg)   Height: 5' 9\" (1.753 m)   PainSc:  10 - Worst pain ever   PainLoc: Knee       Patient Active Problem List   Diagnosis Code    Obesity, morbid ([facility] [address] E66.01     Patient Active Problem List    Diagnosis Date Noted    Obesity, morbid ([facility] [address] 02/22/2018     Current Outpatient Prescriptions   Medication Sig Dispense Refill    hydroCHLOROthiazide (MICROZIDE) 12.5 mg capsule Take 12.5 mg by mouth daily.  azithromycin (ZITHROMAX) 250 mg tablet Take 1 Tab by mouth See Admin Instructions. Take 2 tablets PO on day 1 then 1 tab PO every day for days 2-5 6 Tab 0    guaiFENesin-codeine (CHERATUSSIN AC) 100-10 mg/5 mL solution Take 5 mL by mouth three (3) times daily as needed for Cough.  Max Daily Amount: 15 mL. 120 mL 0    ondansetron hcl (ZOFRAN) 4 mg tablet Take 1 Tab by mouth every eight (8) hours as needed for Nausea. 20 Each 0     Allergies   Allergen Reactions    Tramadol Other (comments)    Prednisone Shortness of Breath     History reviewed. No pertinent past medical history.   Past Surgical History:   Procedure Laterality Date    HX HYSTERECTOMY      2007     Family History   Problem Relation Age of Onset    No Known Problems Mother     No Known Problems Father      Social History   Substance Use Topics    Smoking status: Never Smoker    Smokeless tobacco: Never Used    Alcohol use No

## 2018-02-27 ENCOUNTER — APPOINTMENT (OUTPATIENT)
Dept: PHYSICAL THERAPY | Age: 53
End: 2018-02-27
Payer: COMMERCIAL

## 2018-03-02 ENCOUNTER — HOSPITAL ENCOUNTER (OUTPATIENT)
Dept: PHYSICAL THERAPY | Age: 53
Discharge: HOME | End: 2018-03-02
Payer: COMMERCIAL

## 2018-03-02 PROCEDURE — 97110 THERAPEUTIC EXERCISES: CPT

## 2018-03-02 PROCEDURE — 97112 NEUROMUSCULAR REEDUCATION: CPT

## 2018-03-02 NOTE — PROGRESS NOTES
PT DAILY TREATMENT NOTE     Patient Name: Peng Barbosa  Date:3/2/2018  : 1965  [x]  Patient  Verified  Payor: Kenya Young / Plan: [address] [place] [address] / Product Type: PPO /    In time:400  Out time:430  Total Treatment Time (min): 30  Visit #: 2 of 10    Treatment Area: Pain in right knee [M25.561]    SUBJECTIVE  Pain Level (0-10 scale): 0  Any medication changes, allergies to medications, adverse drug reactions, diagnosis change, or new procedure performed?: [] No    [x] Yes (see summary sheet for update)  Subjective functional status/changes:   [] No changes reported  \"I have no pain. I feel excellent. \"    OBJECTIVE    Modality rationale: patient declined   Min Type Additional Details    [] Estim:  []Unatt       []IFC  []Premod                        []Other:  []w/ice   []w/heat  Position:  Location:    [] Estim: []Att    []TENS instruct  []NMES                    []Other:  []w/US   []w/ice   []w/heat  Position:  Location:    []  Traction: [] Cervical       []Lumbar                       [] Prone          []Supine                       []Intermittent   []Continuous Lbs:  [] before manual  [] after manual    []  Ultrasound: []Continuous   [] Pulsed                           []1MHz   []3MHz W/cm2:  Location:    []  Iontophoresis with dexamethasone         Location: [] Take home patch   [] In clinic    []  Ice     []  heat  []  Ice massage  []  Laser   []  Anodyne Position:  Location:    []  Laser with stim  []  Other:  Position:  Location:    []  Vasopneumatic Device Pressure:       [] lo [] med [] hi   Temperature: [] lo [] med [] hi   [] Skin assessment post-treatment:  []intact []redness- no adverse reaction    []redness  adverse reaction:     10 min Therapeutic Exercise:  [x] See flow sheet :   Rationale: increase ROM and increase strength to improve the patients ability to perform ADLs    20 min Neuromuscular Re-education:  [x]  See flow sheet : quad re-ed activities   Rationale: increase ROM, increase strength, improve coordination, improve balance and increase proprioception  to improve the patients ability to improve mobility, stance stability, and gait         With   [x] TE   [] TA   [x] neuro   [] other: Patient Education: [x] Review HEP    [] Progressed/Changed HEP based on:   [x] positioning   [x] body mechanics   [] transfers   [] heat/ice application    [] other:      Other Objective/Functional Measures:      Pain Level (0-10 scale) post treatment: 0    ASSESSMENT/Changes in Function: Initiated POC to which pt responded well. She reports improvements with pain since her MD changed her medication. She demonstrated a good quad set and squat form. Patient will continue to benefit from skilled PT services to modify and progress therapeutic interventions, address functional mobility deficits, address ROM deficits, address strength deficits, analyze and address soft tissue restrictions, analyze and cue movement patterns, analyze and modify body mechanics/ergonomics, assess and modify postural abnormalities, address imbalance/dizziness and instruct in home and community integration to attain remaining goals. [x]  See Plan of Care  []  See progress note/recertification  []  See Discharge Summary         Progress towards goals / Updated goals:  Short Term Goals: To be accomplished in 2 weeks:  1. Pt will report compliance and independence to HEP to help the pt manage their pain and symptoms.       Eval: Established    MET                  Long Term Goals: To be accomplished in 5 weeks:  1. Pt will increase FOTO score to 66 points to improve ability to perform ADLs. Eval: 47 points  2. Pt will increase MMT right knee EXT to 4/5, knee flex to 4+/5, hip flex/ABD to 4/5 to improve ability to tolerate ADLs. Eval:  right knee EXT 3+/5, knee flex 4/5, hip flex 3+/5, ABD 4-/5  3.  Pt will increase AROM right knee flex to 115 degs to improve ability to bend her knee with less pain when sitting. Eval: 103 degs with pain at end range  4. Pt will report being able to perform a sit to stand with minimal to no increased pain in her left knee to improve transfer ability at work. Eval: reports pain with sit to stand and uses UEs to help initiate transfer  5. Pt will perform a squat with correct mechanics with minimal to no cueing from there therapist to improve ease of mobility at work. Eval: increased weight on the left LE, with increased knee flex and minimal glute activation noted.      PLAN  []  Upgrade activities as tolerated     [x]  Continue plan of care  []  Update interventions per flow sheet       []  Discharge due to:_  []  Other:_      Tracie Grajeda PTA 3/2/2018  5:15 PM    Future Appointments  Date Time Provider Larissa Noble   3/2/2018 4:00 PM [facility]   3/15/2018 4:00 PM [facility] RM 1 [facility]   3/22/2018 8:00 AM MD Edin Walters [address]

## 2018-03-15 ENCOUNTER — HOSPITAL ENCOUNTER (OUTPATIENT)
Dept: MAMMOGRAPHY | Age: 53
Discharge: HOME | End: 2018-03-15
Attending: FAMILY MEDICINE
Payer: COMMERCIAL

## 2018-03-15 DIAGNOSIS — Z12.31 VISIT FOR SCREENING MAMMOGRAM: ICD-10-CM

## 2018-03-15 PROCEDURE — 77067 SCR MAMMO BI INCL CAD: CPT

## 2018-03-22 NOTE — PROGRESS NOTES
[facility] [address]  [address] [place], [address] [phone] fax    Discharge Summary  Patient name: Candy Villafuerte Start of Care: 2018   Referral source: Barber MetzgerVincema : 1965                         Medical Diagnosis: Pain in right knee [M25.561] Onset Date: 1 month ago                         Treatment Diagnosis: right knee pain   Prior Hospitalization: see medical history Provider#: [id number]   Medications: Verified on Patient summary List    Comorbidities: HTN   Prior Level of Function: Independent with ADLs, functional and work tasks with no pain in the right knee. Visits from Start of Care: 2    Missed Visits: 1    Reporting Period : 18 to 3/2/18    LONG-TERM GOALS NOT MET SECONDARY TO SELF-DISCHARGE AT SECOND VISIT:  Short Term Goals: To be accomplished in 2 weeks:  1. Pt will report compliance and independence to [facility] to help the pt manage their pain and symptoms.                            Eval: Established                         MET                  Long Term Goals: To be accomplished in 5 weeks:  1. Pt will increase FOTO score to 66 points to improve ability to perform ADLs. Eval: 47 points  2. Pt will increase MMT right knee EXT to 4/5, knee flex to 4+/5, hip flex/ABD to 4/5 to improve ability to tolerate ADLs. Eval:  right knee EXT 3+/5, knee flex 4/5, hip flex 3+/5, ABD 4-/5  3. Pt will increase AROM right knee flex to 115 degs to improve ability to bend her knee with less pain when sitting. Eval: 103 degs with pain at end range  4. Pt will report being able to perform a sit to stand with minimal to no increased pain in her left knee to improve transfer ability at work. Eval: reports pain with sit to stand and uses UEs to help initiate transfer  5.  Pt will perform a squat with correct mechanics with minimal to no cueing from there therapist to improve ease of mobility at work. Eval: increased weight on the left LE, with increased knee flex and minimal glute activation noted. Assessment/Summary of care: Pt has self-discharged to [facility] after her second visit. Her long term goals are not met.     RECOMMENDATIONS:  [x]Discontinue therapy: []Patient has reached or is progressing toward set goals      [x]Patient has abdicated      []Due to lack of appreciable progress towards set goals    Brendolyn Opitz, PT 3/22/2018 10:34 AM

## 2018-06-11 DIAGNOSIS — M17.11 ARTHRITIS OF KNEE, RIGHT: ICD-10-CM

## 2018-06-11 DIAGNOSIS — M51.9 LUMBAR DISC DISEASE: ICD-10-CM

## 2018-06-11 RX ORDER — DICLOFENAC SODIUM 100 MG/1
TABLET, FILM COATED, EXTENDED RELEASE ORAL
Qty: 30 TAB | Refills: 1 | Status: SHIPPED | OUTPATIENT
Start: 2018-06-11 | End: 2018-08-15 | Stop reason: SDUPTHER

## 2018-08-15 DIAGNOSIS — M17.11 ARTHRITIS OF KNEE, RIGHT: ICD-10-CM

## 2018-08-15 DIAGNOSIS — M51.9 LUMBAR DISC DISEASE: ICD-10-CM

## 2018-08-20 NOTE — TELEPHONE ENCOUNTER
Last Visit: 02/22/2018 with MD Eulalia García    Next Appointment: No show 03/22  Previous Refill Encounters: 06/11/2018 per MD Eulalia García #[id number] with 1 refill     Requested Prescriptions     Pending Prescriptions Disp Refills    diclofenac (VOLTAREN XR) 100 mg ER tablet [Pharmacy Med Name: DICLOFENAC SOD  MG TAB] 90 Tab 1     Sig: Take 1 Tab by mouth daily.

## 2018-08-23 RX ORDER — DICLOFENAC SODIUM 100 MG/1
100 TABLET, FILM COATED, EXTENDED RELEASE ORAL DAILY
Qty: 90 TAB | Refills: 1 | Status: SHIPPED | OUTPATIENT
Start: 2018-08-23

## 2019-01-30 ENCOUNTER — HOSPITAL ENCOUNTER (OUTPATIENT)
Dept: GENERAL RADIOLOGY | Age: 54
Discharge: HOME | End: 2019-01-30
Payer: COMMERCIAL

## 2019-01-30 DIAGNOSIS — M79.644 CHRONIC PAIN OF RIGHT THUMB: ICD-10-CM

## 2019-01-30 DIAGNOSIS — G89.29 CHRONIC PAIN OF RIGHT THUMB: ICD-10-CM

## 2019-01-30 PROCEDURE — 73140 X-RAY EXAM OF FINGER(S): CPT

## 2019-03-21 ENCOUNTER — HOSPITAL ENCOUNTER (OUTPATIENT)
Dept: MAMMOGRAPHY | Age: 54
Discharge: HOME | End: 2019-03-21
Attending: FAMILY MEDICINE
Payer: COMMERCIAL

## 2019-03-21 DIAGNOSIS — Z12.31 VISIT FOR SCREENING MAMMOGRAM: ICD-10-CM

## 2019-03-21 PROCEDURE — 77067 SCR MAMMO BI INCL CAD: CPT

## 2019-05-08 ENCOUNTER — OFFICE VISIT (OUTPATIENT)
Dept: ORTHOPEDIC SURGERY | Age: 54
End: 2019-05-08

## 2019-05-08 VITALS
SYSTOLIC BLOOD PRESSURE: 132 MMHG | BODY MASS INDEX: 41.25 KG/M2 | TEMPERATURE: 97.2 F | HEART RATE: 66 BPM | HEIGHT: 68 IN | DIASTOLIC BLOOD PRESSURE: 82 MMHG | OXYGEN SATURATION: 99 % | WEIGHT: 272.2 LBS

## 2019-05-08 DIAGNOSIS — M17.11 PRIMARY OSTEOARTHRITIS OF RIGHT KNEE: Primary | ICD-10-CM

## 2019-05-08 RX ORDER — MELOXICAM 15 MG/1
15 TABLET ORAL
Qty: 30 TAB | Refills: 1 | Status: SHIPPED | OUTPATIENT
Start: 2019-05-08

## 2019-05-08 RX ORDER — TRIAMCINOLONE ACETONIDE 40 MG/ML
40 INJECTION, SUSPENSION INTRA-ARTICULAR; INTRAMUSCULAR ONCE
Qty: 1 ML | Refills: 0
Start: 2019-05-08 | End: 2019-05-08

## 2019-05-08 NOTE — PROGRESS NOTES
Cathy Stone  1965   Chief Complaint   Patient presents with    Knee Pain     right radiating down leg        HISTORY OF PRESENT ILLNESS  Cathy Stone is a 47 y.o. female who presents today for evaluation of right knee pain. She rates her pain 10/10 today. Pain has been present since February 2019. She was previously seen by Dr. Nadeen Latham. Patient describes the pain as aching and throbbing that is Constant in nature. Symptoms are worse with walking, steps, Activity and is better with  Rest. Associated symptoms include nothing. Since problem started, it: has worsened. Pain does not wake patient up at night. Has taken no meds for the problem. Has tried following treatments: Injections:NO; Brace:NO;  Therapy:NO; Cane/Crutch:NO       Allergies   Allergen Reactions    Tramadol Other (comments)    Prednisone Shortness of Breath        Past Medical History:   Diagnosis Date    Menopause       Social History     Socioeconomic History    Marital status: SINGLE     Spouse name: Not on file    Number of children: Not on file    Years of education: Not on file    Highest education level: Not on file   Occupational History    Not on file   Social Needs    Financial resource strain: Not on file    Food insecurity:     Worry: Not on file     Inability: Not on file    Transportation needs:     Medical: Not on file     Non-medical: Not on file   Tobacco Use    Smoking status: Never Smoker    Smokeless tobacco: Never Used   Substance and Sexual Activity    Alcohol use: No    Drug use: No    Sexual activity: Not on file   Lifestyle    Physical activity:     Days per week: Not on file     Minutes per session: Not on file    Stress: Not on file   Relationships    Social connections:     Talks on phone: Not on file     Gets together: Not on file     Attends Baptism service: Not on file     Active member of club or organization: Not on file     Attends meetings of clubs or organizations: Not on file Relationship status: Not on file    Intimate partner violence:     Fear of current or ex partner: Not on file     Emotionally abused: Not on file     Physically abused: Not on file     Forced sexual activity: Not on file   Other Topics Concern    Not on file   Social History Narrative    Not on file      Past Surgical History:   Procedure Laterality Date    HX HYSTERECTOMY      2007    HX OOPHORECTOMY        Family History   Problem Relation Age of Onset    No Known Problems Mother     No Known Problems Father       Current Outpatient Medications   Medication Sig    diclofenac (VOLTAREN XR) 100 mg ER tablet Take 1 Tab by mouth daily.  hydroCHLOROthiazide (MICROZIDE) 12.5 mg capsule Take 12.5 mg by mouth daily.  azithromycin (ZITHROMAX) 250 mg tablet Take 1 Tab by mouth See Admin Instructions. Take 2 tablets PO on day 1 then 1 tab PO every day for days 2-5    guaiFENesin-codeine (CHERATUSSIN AC) 100-10 mg/5 mL solution Take 5 mL by mouth three (3) times daily as needed for Cough. Max Daily Amount: 15 mL.  ondansetron hcl (ZOFRAN) 4 mg tablet Take 1 Tab by mouth every eight (8) hours as needed for Nausea. No current facility-administered medications for this visit. REVIEW OF SYSTEM   Patient denies: Weight loss, Fever/Chills, HA, Visual changes, Fatigue, Chest pain, SOB, Abdominal pain, N/V/D/C, Blood in stool or urine, Edema. Pertinent positive as above in HPI. All others were negative    PHYSICAL EXAM:   Visit Vitals  /82 (BP 1 Location: Right arm, BP Patient Position: Sitting)   Pulse 66   Temp 97.2 °F (36.2 °C) (Oral)   Ht 5' 8\" (1.727 m)   Wt 272 lb 3.2 oz (123.5 kg)   SpO2 99%   BMI 41.39 kg/m²     The patient is a well-developed, well-nourished female   in no acute distress. The patient is alert and oriented times three. The patient is alert and oriented times three.  Mood and affect are normal.  LYMPHATIC: lymph nodes are not enlarged and are within normal limits  SKIN: normal in color and non tender to palpation. There are no bruises or abrasions noted. NEUROLOGICAL: Motor sensory exam is within normal limits. Reflexes are equal bilaterally. There is normal sensation to pinprick and light touch  MUSCULOSKELETAL:  Examination Right knee   Skin Intact   Range of motion    Effusion +   Medial joint line tenderness +   Lateral joint line tenderness -   Tenderness Pes Bursa -   Tenderness insertion MCL -   Tenderness insertion LCL -   Miguel As -   Patella crepitus +   Patella grind +   Lachman -   Pivot shift -   Anterior drawer -   Posterior drawer -   Varus stress -   Valgus stress -   Neurovascular Intact   Calf Swelling and Tenderness to Palpation -   Cody's Test -   Hamstring Cord Tightness -     PROCEDURE: After sterile prep, 4 cc of Xylocaine and 1 cc of Kenalog were injected into the right knee.        [facility]  OFFICE PROCEDURE PROGRESS NOTE        Chart reviewed for the following:  Navdeep Salgado MD, have reviewed the History, Physical and updated the Allergic reactions for [address] performed immediately prior to start of procedure:  Navdeep Salgado MD, have performed the following reviews on Dean Mantilla prior to the start of the procedure:            * Patient was identified by name and date of birth   * Agreement on procedure being performed was verified  * Risks and Benefits explained to the patient  * Procedure site verified and marked as necessary  * Patient was positioned for comfort  * Consent was signed and verified     Time: 3:30 PM    Date of procedure: 5/8/2019    Procedure performed by:  Marichuy Ugalde MD    Provider assisted by: (see medication administration)    How tolerated by patient: tolerated the procedure well with no complications    Comments: none    IMAGING: XR of right knee dated 2/22/18 was reviewed and read:   IMPRESSION:   Mild degenerative changes of the right knee. She still has adequate joint space remaining in the weightbearing portion of both knees. There is no acute osseus pathology. IMPRESSION:      ICD-10-CM ICD-9-CM    1. Primary osteoarthritis of right knee M17.11 715.16 meloxicam (MOBIC) 15 mg tablet      TRIAMCINOLONE ACETONIDE INJ      triamcinolone acetonide (KENALOG) 40 mg/mL injection      DRAIN/INJECT LARGE JOINT/BURSA        PLAN:  1. The patient presents today with right knee pain due to osteoarthritis and I would like to try an injection today. Risk factors include: n/a  2. No ultrasound exam indicated today  3. Yes cortisone injection indicated today R KNEE  4. No Physical/Occupational Therapy indicated today  5. No diagnostic test indicated today:   6. No durable medical equipment indicated today  7. No referral indicated today   8. No medications indicated today:   9. No Narcotic indicated today    RTC 3 weeks if pain continues    Scribed by Katja Mendez ([address]) as dictated by Tin Conde MD    I, Dr. Tin Conde, confirm that all documentation is accurate.     Tin Conde M.D.   [facility]

## 2019-05-14 ENCOUNTER — HOSPITAL ENCOUNTER (OUTPATIENT)
Dept: PHYSICAL THERAPY | Age: 54
Discharge: HOME | End: 2019-05-14
Payer: COMMERCIAL

## 2019-05-14 PROCEDURE — 97110 THERAPEUTIC EXERCISES: CPT

## 2019-05-14 PROCEDURE — 97140 MANUAL THERAPY 1/> REGIONS: CPT

## 2019-05-14 PROCEDURE — 97535 SELF CARE MNGMENT TRAINING: CPT

## 2019-05-14 PROCEDURE — 97165 OT EVAL LOW COMPLEX 30 MIN: CPT

## 2019-05-14 NOTE — PROGRESS NOTES
[facility] [address]  Kristine Bridgeton [address] [place], [address] 
[phone] fax Plan of Care/Statement of Necessity for Occupational Therapy Services Patient name: Kelly Molina Start of Care: 2019 Referral source: Janneth Briggs, [place] : 1965 Medical Diagnosis: Pain in left hand [M79.642] Pain in right hand [M79.641] Payor: Beatriz Emerson / Plan: [address] [place] [address] / Product Type: PPO /  Onset Date:2019 Treatment Diagnosis: right thumb pain Prior Hospitalization: see medical history Provider#: [id number] Medications: Verified on Patient summary List  
 Comorbidities: Diabetes, h/o bilateral CTS, HTN, arthritis Prior Level of Function: Independent with ADL/IADL tasks without functional limitations of right hand. The Plan of Care and following information is based on the information from the initial evaluation. Assessment/ key information: Patient is a left hand dominant 47 y.o. female with a chief complaint of right hand pain and inability to bend right thumb without increased pain. Patient states she was at work when she began to notice the right thumb pain. 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 She presents to skilled OT today with increased pain with palpation to right thumb MCP and IP jts. She is observed to have locking of the right thumb when attempting to make a fist which is causing her increased pain and having to passively extend, indicating possible right trigger thumb. Pt presents with inability to make a composite fist with right hand. Patient received an initial evaluation today followed by education as to diagnosis, precautions and treatment plan. Patient was provided with a basic home exercise program including IP AROM.  It is recommended that the patient be custom fitted for a short opponens splint with the right thumb MP joint positioned in full extension to be worn for 6 weeks to reduce inflammation. Skilled OT services are necessary to address deficits to improve quality of life. Evaluation Complexity: History LOW Complexity : Brief history review  Examination LOW Complexity : 1-3 performance deficits relating to physical, cognitive , or psychosocial skils that result in activity limitations and / or participation restrictions  Clinical Decision Making LOW Complexity : No comorbidities that affect functional and no verbal or physical assistance needed to complete eval tasks Overall Complexity Rating: LOW Problem List: Pain effecting function, Decreased range of motion, Decreased strength, Edema effecting function, Decreased coordination/prehension, Decreased ADL/functional abilities  and Decreased flexibility/joint mobility Treatment Plan may include any combination of the following: Therapeutic exercise, Therapeutic activities, Physical agent/modality, Manual therapy, Splinting/orthoses, Patient education and ADLs/IADLs Patient / Family readiness to learn indicated by: asking questions, trying to perform skills and interest 
Persons(s) to be included in education:   patient (P) Barriers to Learning/Limitations: None Patient Goal (s): Flinja Patient Self Reported Health Status: good Rehabilitation Potential: good Short Term Goals: To be accomplished in 2  weeks: 
Goal:* Patient will be compliant with initial home exercise program to take an active role in their rehabilitation process. Status at Eval:pt provided and instructed in thumb IP AROM Goal:* Patient will demonstrate a good understanding of their condition and strategies for self-management. Status at Eval:Patient received an initial evaluation today followed by education as to diagnosis, precautions and treatment plan. Long Term Goals: To be accomplished in 4 weeks: Goal:*Patient will regain 50 degrees flexion of the right thumb IP joint to enable grasp of cylindrical objects such as a glass, handle or toothbrush. Status at eval: 32 degrees 
  
Goal:* Patient will report 4/10 pain level with weight bearing tasks in order to demonstrate improved functional performance. Status at eval: 8/10 
  
Goal:* Patient will show a 20 point improvement on FOTO functional status measure to improve overall functional performance. Status at eval: 48 Frequency / Duration: Patient to be seen 2 times per week for 4 weeks: 
  
Patient/ Caregiver education and instruction: Diagnosis, prognosis, self care, activity modification, brace/ splint application and exercises 
[x]  Plan of care has been reviewed with KHANG Chen OT 5/14/2019 6:34 PM 
________________________________________________________________________ I certify that the above Therapy Services are being furnished while the patient is under my care. I agree with the treatment plan and certify that this therapy is necessary. [de-identified] Signature:____________Date:_________TIME:________ 
 
[employer], Date and Time must be completed for valid certification ** Please sign and return to [facility] [address] [place], [address] 
[phone] fax

## 2019-05-14 NOTE — PROGRESS NOTES
Hand Therapy Evaluation and Daily Note Patient Name: Mee Child Date:2019 : 1965 Age: 47 y.o.y/o [x]  Patient  Verified Payor: Kenyatta Noland / Plan: [address] [place] [address] / Product Type: PPO /   
Referring Provider: GIANCARLO Aguillon MD Visit:  3 months Onset Date:  2019 Surgical Date: n/a Surgical Procedure: n/a In time:5:08 PM  Out time:6:02 PM 
Total Treatment Time (min): 54 Total Timed Codes (min): 28 
1:1 Treatment Time ([facility] only): 54 Visit #: 1 of 8 Treatment Area: Pain in left hand [M79.642] Pain in right hand [M79.641] Precautions: 
 
Hand Dominance: left handed Hand Involved: right Total Evaluation Time:  32 History of Present Condition:  Patient is a left hand dominant 47 y.o. female with a chief complaint of right hand pain and inability to bend right thumb without increased pain. Patient states she was at work when she began to notice the right thumb pain. 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 She presents to skilled OT today with increased pain with palpation to right thumb MCP and IP jts. She is observed to have locking of the right thumb when attempting to make a fist which is causing her increased pain and having to passively extend, indicating possible right trigger thumb. Pt presents with inability to make a composite fist with right hand. Patient received an initial evaluation today followed by education as to diagnosis, precautions and treatment plan. Patient was provided with a basic home exercise program including IP AROM. It is recommended that the patient be custom fitted for a short opponens splint with the right thumb MP joint positioned in full extension to be worn for 6 weeks to reduce inflammation. Skilled OT services are necessary to address deficits to improve quality of life. Pain Rating: Current: (0-no pain 10-debilitating pain) severe At best: (0-no pain 10-debilitating pain) moderate At worst: (0-no pain 10-debilitating pain) severe Location: right thumb Type:  severe Better with: nothing Worse with: attempt to use it Medications/Allergies/Past Medical History:  See chart; reviewed with patient. Diabetes, h/o bilateral CTS, HTN, arthritis Diagnostic Tests: xray - 1/30/19 - Findings: Alignment is anatomic. There is no acute fracture or dislocation. No 
radiopaque foreign body is seen. Well-corticated ossific density adjacent to the 
first interphalangeal joint. 
  
IMPRESSION Impression: 1. No definite acute fracture or dislocation. Well-corticated ossific density 
adjacent to the first interphalangeal joint is likely chronic. Suggest clinical 
correlation for point tenderness for reassurance. Prior Level of Function: Independent with ADL/IADL tasks without functional limitations of right hand. Current Level of Function:  Decreased ADL efficiency, edema, pain, decreased ROM, decreased strength Social History: Pt lives in [facility] home and stays on second level floor and she cares for 3 y/o niece. Occupation/Job Requirements: lifting and opening containers Observation: right thumb locking Scar/incision:   n/a Location:  Right thumb Palpation:  Pain with palpation to Range of Motion:   THUMB ROM CHART as measured in degrees Thumb, Side Active/Passive 5/14/2019 MP Extend/flex 55 - 55  
IP 0-32 Radial Abd. 60  
Palmar Abd. Opposition WNL Strength:  DNT Sensation:    intact Edema: GIRTH CHART measured in cm 
Date: 5/14/2019 Side R/L   
DPC circum. 20.9/20.8 Wrist Crease 18.6/18.1 Special Tests: n/a ADLs Feeding:        []MaxA   []ModA   []Julio   [] CGA   []SBA   [x]Bobby   []Independent UE Dressing:       []MaxA   []ModA   [x]Julio   [] CGA   []SBA   []Bobby   []Independent LE Dressing:       []MaxA   []ModA   [x]Julio   [] CGA   []SBA   []Bobby   []Independent Grooming:       []MaxA   []ModA   [x]Julio   [] CGA   []SBA   []Bobby   []Independent Toileting:       []MaxA   []ModA   []Julio   [] CGA   []SBA   [x]Bobby   []Independent Bathing:       []MaxA   []ModA   [x]Julio   [] CGA   []SBA   []Bobby   []Independent Light Meal Prep:    []MaxA   []ModA   [x]Julio   [] CGA   []SBA   []Bobby   []Independent Household/Other: []MaxA   []ModA   [x]Julio   [] CGA   []SBA   []Bobby   []Independent Adaptive Equip:     []MaxA   []ModA   []Julio   [] CGA   []SBA   []Bobby   []Independent Driving:       []MaxA   []ModA   []Julio   [] CGA   []SBA   []Bboby   [x]Independent Todays Treatment:  Patient received an initial evaluation today followed by education as to diagnosis, precautions and treatment plan. Patient was provided with a basic home exercise program including IP AROM. OBJECTIVE 8 min Therapeutic Exercise:  [] See flow sheet :  
Rationale: increase ROM to improve the patients ability to move right thumb without increased pain 10 min Manual Therapy:  IASTM using tool #6 using brushing technique Rationale: decrease pain, increase ROM, increase tissue extensibility, decrease edema  and decrease trigger points to right thumb trigger 10 min Self Care/Home Management: activity modifications, splint wear Rationale: education  to improve the patients ability to promote healing of injured site. With 
 [x] TE 
 [] TA 
 [] neuro 
 [] other: Patient Education: [x] Review HEP [] Progressed/Changed HEP based on:  
[] positioning   [] body mechanics   [] transfers   [] heat/ice application  
[] Splint wear/care   [] Sensory re-education   [] scar management     
[] other:   
 
Pain Level (0-10 scale) post treatment: 6/10 Patient will continue to benefit from skilled OT services to modify and progress therapeutic interventions, address ROM deficits, address strength deficits and analyze and address soft tissue restrictions to attain goals. Short Term Goals: To be accomplished in 2  weeks: 
Goal:* Patient will be compliant with initial home exercise program to take an active role in their rehabilitation process. Status at Eval:pt provided and instructed in thumb IP AROM Goal:* Patient will demonstrate a good understanding of their condition and strategies for self-management. Status at Eval:Patient received an initial evaluation today followed by education as to diagnosis, precautions and treatment plan. Long Term Goals: To be accomplished in 4 weeks:   
Goal:*Patient will regain 50 degrees flexion of the right thumb IP joint to enable grasp of cylindrical objects such as a glass, handle or toothbrush. Status at eval: 32 degrees Goal:* Patient will report 4/10 pain level with weight bearing tasks in order to demonstrate improved functional performance. Status at eval: 8/10 Goal:* Patient will show a 20 point improvement on FOTO functional status measure to improve overall functional performance. Status at eval: 48 Frequency / Duration: Patient to be seen 2 times per week for 4 weeks: 
 
Patient/ Caregiver education and instruction: Diagnosis, prognosis, self care, activity modification, brace/ splint application and exercises Functional Status Measure: 
Patient's:48 FOTO Benchmark: 68 
Expected Change: 20 FOTO score based on 0 - 100 point scale, with 100 being no impairment. These scores are determined by patient reported functional assessments compared against national benchmarked data.  
 
Selene Shin OT 5/14/2019 5:10 PM

## 2019-05-29 ENCOUNTER — OFFICE VISIT (OUTPATIENT)
Dept: ORTHOPEDIC SURGERY | Age: 54
End: 2019-05-29

## 2019-05-29 ENCOUNTER — HOSPITAL ENCOUNTER (OUTPATIENT)
Dept: PHYSICAL THERAPY | Age: 54
Discharge: HOME | End: 2019-05-29
Payer: COMMERCIAL

## 2019-05-29 VITALS
OXYGEN SATURATION: 96 % | HEART RATE: 63 BPM | DIASTOLIC BLOOD PRESSURE: 95 MMHG | SYSTOLIC BLOOD PRESSURE: 166 MMHG | BODY MASS INDEX: 40.77 KG/M2 | RESPIRATION RATE: 16 BRPM | HEIGHT: 68 IN | WEIGHT: 269 LBS

## 2019-05-29 DIAGNOSIS — M17.11 PRIMARY OSTEOARTHRITIS OF RIGHT KNEE: Primary | ICD-10-CM

## 2019-05-29 PROCEDURE — 97535 SELF CARE MNGMENT TRAINING: CPT

## 2019-05-29 RX ORDER — MELOXICAM 15 MG/1
15 TABLET ORAL
Qty: 30 TAB | Refills: 1 | Status: SHIPPED | OUTPATIENT
Start: 2019-05-29 | End: 2020-05-27 | Stop reason: SDUPTHER

## 2019-05-29 RX ORDER — CARVEDILOL 6.25 MG/1
TABLET ORAL 2 TIMES DAILY WITH MEALS
COMMUNITY

## 2019-05-29 RX ORDER — LOSARTAN POTASSIUM 25 MG/1
TABLET ORAL DAILY
COMMUNITY

## 2019-05-29 NOTE — PROGRESS NOTES
OT DAILY TREATMENT NOTE 10-18    Patient Name: Leland Post  Date:2019  : 1965  [x]  Patient  Verified  Payor: BLUE CROSS / Plan: [address] [place] [address] / Product Type: PPO /    In time:  Out time:  Total Treatment Time (min): 31  Visit #: 2 of 8    Medicare/BCBS Only   Total Timed Codes (min):  31 1:1 Treatment Time: 31     Treatment Area: Pain in left hand [M79.642]  Pain in right hand [M79.641]    SUBJECTIVE  Pain Level (0-10 scale):0/10 at rest  Any medication changes, allergies to medications, adverse drug reactions, diagnosis change, or new procedure performed?: [x] No    [] Yes (see summary sheet for update)  Subjective functional status/changes:   [] No changes reported  \"I haven't seen a hand specialist, just the regular doctor. \"    OBJECTIVE    31 min Self Care/Home Management: anatomy, triggering, sesamoid bones, osteochondromas   Rationale: increase ROM and increase strength  to improve the patients ability to use the right thumb without pain. With   [] TE   [] TA   [] neuro   [] other: Patient Education: [x] Review HEP    [] Progressed/Changed HEP based on:   [] positioning   [] body mechanics   [] transfers   [] heat/ice application   [] Splint wear/care   [] Sensory re-education   [] scar management      [] other:            Other Objective/Functional Measures: THUMB ROM CHART as measured in degrees  Thumb, Side  Active/Passive 2019   MP Extend/flex  55 - 55 15/55   IP 0-32 0/30   Radial Abd. 60    Palmar Abd.      Opposition WNL      Pain Level (0-10 scale) post treatment: 0/10 at rest    ASSESSMENT/Changes in Function: large palpable mass at the A2 pulley of the right thumb. Patient reports pain, triggering (intermittent) and fast growth of nodule. Referral to hand surgeon for evaluation recommended and scheduled. Patient to follow up with MD and then return for follow ups scheduled.     Patient will continue to benefit from skilled OT services to address ROM deficits, address strength deficits and analyze and address soft tissue restrictions to attain remaining goals. []  See Plan of Care  []  See progress note/recertification  []  See Discharge Summary         Progress towards goals / Updated goals:  Short Term Goals: To be accomplished in 2  weeks:  Goal:* Patient will be compliant with initial home exercise program to take an active role in their rehabilitation process. Status at Eval:pt provided and instructed in thumb IP AROM  Goal:* Patient will demonstrate a good understanding of their condition and strategies for self-management. Status at Eval:Patient received an initial evaluation today followed by education as to diagnosis, precautions and treatment plan. Long Term Goals: To be accomplished in 4 weeks:                          Goal:*Patient will regain 50 degrees flexion of the right thumb IP joint to enable grasp of cylindrical objects such as a glass, handle or toothbrush. Status at [facility]: 32 degrees  0/30 5/29/19     Goal:* Patient will report 4/10 pain level with weight bearing tasks in order to demonstrate improved functional performance. Status at eval: 8/10  0/10 pian at rest 5/29/19     Goal:* Patient will show a 20 point improvement on FOTO functional status measure to improve overall functional performance.   Status at eval: 48        PLAN  []  Upgrade activities as tolerated     [x]  Continue plan of care  []  Update interventions per flow sheet       []  Discharge due to:_  []  Other:_      Linsey Billingsley OT 5/29/2019  5:19 PM    Future Appointments   Date Time Provider Larissa Noble   5/29/2019  5:30 [facility]   5/31/2019  3:30 PM [facility]   6/5/2019  5:00 PM Vishnu Sarah OT [facility]   6/6/2019  9:40 AM DO Marleen De Jesus   6/7/2019  3:30 [facility]   6/10/2019  3:30 PM Gaby San OT [facility]   6/12/2019  3:30 [facility]en[facility]

## 2019-05-29 NOTE — PROGRESS NOTES
1. Have you been to the ER, urgent care clinic since your last visit? Hospitalized since your last visit? NO    2. Have you seen or consulted any other health care providers outside of the [address] [place] [address] since your last visit? Include any pap smears or colon screening.  NO

## 2019-05-29 NOTE — PROGRESS NOTES
Tena Cagle  1965   Chief Complaint   Patient presents with    Knee Pain     right knee pain, f/u appt. HISTORY OF PRESENT ILLNESS  Tena Cagle is a 47 y.o. female who presents today for reevaluation of right knee pain. Patient rates pain as 0/10 today. At last OV, patient had a cortisone injection which provided good relief. Pain has been present since February 2019. Patient denies any fever, chills, chest pain, shortness of breath or calf pain. The remainder of the review of systems is negative. There are no new illness or injuries to report since last seen in the office. There are no changes to medications, allergies, family or social history. Pain Assessment  5/29/2019   Location of Pain Knee   Location Modifiers Right   Severity of Pain 0   Quality of Pain Sharp   Duration of Pain (No Data)   Duration of Pain Comment a few seconds   Frequency of Pain Intermittent   Aggravating Factors Walking;Standing   Aggravating Factors Comment twisting/making a turn   Limiting Behavior No   Relieving Factors Exercises   Relieving Factors Comment -   Result of Injury No     PHYSICAL EXAM:   Visit Vitals  BP (!) 166/95 (BP 1 Location: Right arm, BP Patient Position: Sitting)   Pulse 63   Resp 16   Ht 5' 8\" (1.727 m)   Wt 269 lb (122 kg)   SpO2 96%   BMI 40.90 kg/m²     The patient is a well-developed, well-nourished female   in no acute distress. The patient is alert and oriented times three. The patient is alert and oriented times three. Mood and affect are normal.  LYMPHATIC: lymph nodes are not enlarged and are within normal limits  SKIN: normal in color and non tender to palpation. There are no bruises or abrasions noted. NEUROLOGICAL: Motor sensory exam is within normal limits. Reflexes are equal bilaterally.  There is normal sensation to pinprick and light touch  MUSCULOSKELETAL:  Examination Right knee   Skin Intact   Range of motion    Effusion -   Medial joint line tenderness - Lateral joint line tenderness -   Tenderness Pes Bursa -   Tenderness insertion MCL -   Tenderness insertion LCL -   Miguel As -   Patella crepitus +   Patella grind +   Lachman -   Pivot shift -   Anterior drawer -   Posterior drawer -   Varus stress -   Valgus stress -   Neurovascular Intact   Calf Swelling and Tenderness to Palpation -   Cody's Test -   Hamstring Cord Tightness -     IMAGING: XR of right knee dated 2/22/18 was reviewed and read:   IMPRESSION:   Mild degenerative changes of the right knee. She still has adequate joint space remaining in the weightbearing portion of both knees. There is no acute osseus pathology. IMPRESSION:      ICD-10-CM ICD-9-CM    1. Primary osteoarthritis of right knee M17.11 715.16 meloxicam (MOBIC) 15 mg tablet        PLAN:   1. The patient presents today with right knee pain due to osteoarthritis which was helped greatly by the cortisone injection. Risk factors include: n/a  2. No ultrasound exam indicated today  3. No cortisone injection indicated today   4. No Physical/Occupational Therapy indicated today  5. No diagnostic test indicated today:   6. No durable medical equipment indicated today  7. No referral indicated today   8. Yes medications indicated today: MOBIC  9. No Narcotic indicated today     RTC prn    Scribed by [facility]) as dictated by Wade Hendricks MD    I, Dr. Wade Hendricks, confirm that all documentation is accurate.     Wade Hendricks M.D.   [facility]

## 2019-05-31 ENCOUNTER — APPOINTMENT (OUTPATIENT)
Dept: PHYSICAL THERAPY | Age: 54
End: 2019-05-31
Payer: COMMERCIAL

## 2019-06-05 ENCOUNTER — APPOINTMENT (OUTPATIENT)
Dept: PHYSICAL THERAPY | Age: 54
End: 2019-06-05

## 2019-06-06 ENCOUNTER — OFFICE VISIT (OUTPATIENT)
Dept: ORTHOPEDIC SURGERY | Age: 54
End: 2019-06-06

## 2019-06-06 VITALS
BODY MASS INDEX: 40.68 KG/M2 | OXYGEN SATURATION: 97 % | RESPIRATION RATE: 11 BRPM | SYSTOLIC BLOOD PRESSURE: 156 MMHG | DIASTOLIC BLOOD PRESSURE: 94 MMHG | HEART RATE: 60 BPM | HEIGHT: 68 IN | TEMPERATURE: 96.8 F | WEIGHT: 268.4 LBS

## 2019-06-06 DIAGNOSIS — M65.311 TRIGGER FINGER OF RIGHT THUMB: Primary | ICD-10-CM

## 2019-06-06 DIAGNOSIS — M25.531 RIGHT WRIST PAIN: ICD-10-CM

## 2019-06-06 NOTE — PROGRESS NOTES
1. Have you been to the ER, urgent care clinic since your last visit? Hospitalized since your last visit? No    2. Have you seen or consulted any other health care providers outside of the [address] [place] [address] since your last visit? Include any pap smears or colon screening.  No

## 2019-06-06 NOTE — PATIENT INSTRUCTIONS
Trigger Finger: Care Instructions Your Care Instructions A trigger finger is a finger stuck in a bent position. The bent finger usually straightens out on its own. A trigger finger can be painful, but it normally is not a serious problem. Trigger fingers seem to occur more in some groups of people. These include people who have diabetes or arthritis or who have injured their hands in the past. This problem also occurs in musicians and people who  tools often. Rest, exercises, and other things you can do at home may help your trigger finger relax so that it can bend as it should. You may get a corticosteroid shot. This can reduce swelling and pain. Your doctor may put a splint on your finger. This will give your finger some rest and avoid irritating the joint. You may need surgery if the finger keeps locking in a bent position. Follow-up care is a key part of your treatment and safety. Be sure to make and go to all appointments, and call your doctor if you are having problems. It's also a good idea to know your test results and keep a list of the medicines you take. How can you care for yourself at home? · If your doctor put a splint on your finger, wear the splint as directed. Do not remove it until your doctor says you can. · You may need to change your activities to avoid movements that irritate the finger. · If your finger is swollen, put ice or a cold pack on your finger for 10 to 20 minutes at a time. Try to do this every 1 to 2 hours for the next 3 days (when you are awake) or until the swelling goes down. Put a thin cloth between the ice and your skin. · Prop up your hand on a pillow when you ice it or anytime you sit or lie down during the next 3 days. Try to keep it above the level of your heart. This will help reduce swelling. · Take your medicines exactly as prescribed. Call your doctor if you think you are having a problem with your medicine. · Ask your doctor if you can take an over-the-counter pain medicine, such as acetaminophen (Tylenol), ibuprofen (Advil, Motrin), or naproxen (Aleve). Be safe with medicines. Read and follow all instructions on the label. · If your doctor recommends exercises, do them as directed. When should you call for help? Call your doctor now or seek immediate medical care if: 
  · Your finger locks in a bent position and will not straighten.  
 Watch closely for changes in your health, and be sure to contact your doctor if: 
  · You do not get better as expected. Where can you learn more? Go to http://humera-ryder.info/. Enter M826 in the search box to learn more about \"Trigger Finger: Care Instructions. \" Current as of: September 20, 2018 Content Version: 11.9 © 3918-1788 [employer]. Care instructions adapted under license by [employer] (which disclaims liability or warranty for this information). If you have questions about a medical condition or this instruction, always ask your healthcare professional. [address] any warranty or liability for your use of this information.

## 2019-06-06 NOTE — PROGRESS NOTES
Gabriel Simon is a 47 y.o. female left handed [employer]. Worker's Compensation and legal considerations: not known. Vitals:    06/06/19 0907   BP: (!) 156/94   Pulse: 60   Resp: 11   Temp: 96.8 °F (36 °C)   TempSrc: Oral   SpO2: 97%   Weight: 268 lb 6.4 oz (121.7 kg)   Height: 5' 8\" (1.727 m)   PainSc:   7   PainLoc: Finger           Chief Complaint   Patient presents with    Thumb Pain     RIGHT THUMB PAIN         HPI: Patient comes in today with complaints of right thumb pain as well as pain that goes into the wrist.  She denies any injury. Date of onset: January 2019    Injury: No    Prior Treatment:  No    Numbness/ Tingling: No    ROS: Review of Systems - General ROS: negative  Respiratory ROS: no cough, shortness of breath, or wheezing  Cardiovascular ROS: no chest pain or dyspnea on exertion  Musculoskeletal ROS: positive for - pain in thumb - right and wrist - right  Neurological ROS: negative  Dermatological ROS: negative    Past Medical History:   Diagnosis Date    Menopause        Past Surgical History:   Procedure Laterality Date    HX HYSTERECTOMY      2007    HX OOPHORECTOMY         Current Outpatient Medications   Medication Sig Dispense Refill    losartan (COZAAR) 25 mg tablet Take  by mouth daily.  carvedilol (COREG) 6.25 mg tablet Take  by mouth two (2) times daily (with meals).  meloxicam (MOBIC) 15 mg tablet Take 1 Tab by mouth daily (with breakfast). 30 Tab 1    meloxicam (MOBIC) 15 mg tablet Take 1 Tab by mouth daily (with breakfast). 30 Tab 1    diclofenac (VOLTAREN XR) 100 mg ER tablet Take 1 Tab by mouth daily. 90 Tab 1    hydroCHLOROthiazide (MICROZIDE) 12.5 mg capsule Take 12.5 mg by mouth daily.  azithromycin (ZITHROMAX) 250 mg tablet Take 1 Tab by mouth See Admin Instructions.  Take 2 tablets PO on day 1 then 1 tab PO every day for days 2-5 6 Tab 0    guaiFENesin-codeine (CHERATUSSIN AC) 100-10 mg/5 mL solution Take 5 mL by mouth three (3) times daily as needed for Cough. Max Daily Amount: 15 mL. 120 mL 0    ondansetron hcl (ZOFRAN) 4 mg tablet Take 1 Tab by mouth every eight (8) hours as needed for Nausea. 20 Each 0       Allergies   Allergen Reactions    Tramadol Other (comments)    Prednisone Shortness of Breath         PE:  There is minimal tenderness to palpation about the wrist and there is no positive Finkelstein or pain about the first dorsal compartment on palpation. Hand:    Examination L Digit(s) R Digit(s)   1st CMC Tenderness -  -    1st CMC Grind -  -    Sathya Nodes -  -    Heberden Nodes -  -    A1 Pulley Tenderness -  + Th   Triggering -  + Th   UCL Instability -  -    RCL Instability -  -    Lateral Stress Pain -  -    Palmar Cords -  -    Tabletop test -  -    Garrod's Pads -  -     Strength       Pinch Strength         ROM: Full      Imaging:     Plain films of the left wrist as well as previous films of the left thumb does not show any acute fracture or dislocation. There are minimal degenerative changes noted. ICD-10-CM ICD-9-CM    1. Trigger finger of right thumb M65.311 727.03    2.  Right wrist pain M25.531 719.43 AMB POC XRAY, WRIST; COMPLETE, 3+ VIE       Plan:     Right A1 pulley injection of the thumb    Follow-up PRN    Plan was reviewed with patient, who verbalized agreement and understanding of the plan    Swetha 36 NOTE        Chart reviewed for the following:   ISourav DO, have reviewed the History, Physical and updated the Allergic reactions for [facility] [address] performed immediately prior to start of procedure:   Sourav LORD DO, have performed the following reviews on [facility] prior to the start of the procedure:            * Patient was identified by name and date of birth   * Agreement on procedure being performed was verified  * Risks and Benefits explained to the patient  * Procedure site verified and marked as necessary  * Patient was positioned for comfort  * Consent was signed and verified     Time: 10:00 AM      Date of procedure: 6/6/2019    Procedure performed by: Nils Tsai DO    Provider assisted by: Aydin Juan LPN    Patient assisted by: self    How tolerated by patient: tolerated the procedure well with no complications    Post Procedural Pain Scale: 0 - No Hurt    Comments: none    Procedure:  After consent was obtained, using sterile technique the A1 pulley was prepped. Local anesthetic used: 1% lidocaine. Kenalog 5 mg and was then injected and the needle withdrawn. The procedure was well tolerated. The patient is asked to continue to rest the area for a few more days before resuming regular activities. It may be more painful for the first 1-2 days. Watch for fever, or increased swelling or persistent pain in the joint. Call or return to clinic prn if such symptoms occur or there is failure to improve as anticipated.

## 2019-06-07 ENCOUNTER — HOSPITAL ENCOUNTER (OUTPATIENT)
Dept: PHYSICAL THERAPY | Age: 54
End: 2019-06-07

## 2019-06-10 ENCOUNTER — APPOINTMENT (OUTPATIENT)
Dept: PHYSICAL THERAPY | Age: 54
End: 2019-06-10

## 2019-06-28 NOTE — PROGRESS NOTES
[facility]  [address]  [place], [address]  [phone] fax    Occupational Therapy Discharge Summary    Patient name: Jovi August Start of Care: 2019   Referral source: GIANCARLO Kang : 1965   Medical/Treatment Diagnosis: Pain in left hand [M79.642]  Pain in right hand [M79.641]  Payor: BLUE CROSS / Plan: [address] [place] [address] / Product Type: PPO /  Onset Date:2019     Prior Hospitalization: see medical history Provider#: [id number]   Medications: Verified on Patient Summary List     Comorbidities: Diabetes, h/o bilateral CTS, HTN, arthritis   Prior Level of Function: Independent with ADL/IADL tasks without functional limitations of right hand. Visits from Start of Care: 2    Missed Visits: 4  Reporting Period : 2019 to 2019    Summary of Care:  Short Term Goals: To be accomplished in 2  weeks:  Goal:* Patient will be compliant with initial home exercise program to take an active role in their rehabilitation process. Status at Eval:pt provided and instructed in thumb IP AROM  Goal:* Patient will demonstrate a good understanding of their condition and strategies for self-management. Status at Eval:Patient received an initial evaluation today followed by education as to diagnosis, precautions and treatment plan. Long Term Goals: To be accomplished in 4 weeks:                          Goal:*Patient will regain 50 degrees flexion of the right thumb IP joint to enable grasp of cylindrical objects such as a glass, handle or toothbrush. Status at eval: 32 degrees  0/30 19     Goal:* Patient will report 4/10 pain level with weight bearing tasks in order to demonstrate improved functional performance. Status at eval: 8/10  0/10 pian at rest 19     Goal:* Patient will show a 20 point improvement on FOTO functional status measure to improve overall functional performance.   Status at eval: 48    ASSESSMENT/RECOMMENDATIONS: During patient's last treatment session, large palpable mass at the A2 pulley of the right thumb. Patient reports pain, triggering (intermittent) and fast growth of nodule. Therapist referred her to hand surgeon for evaluation. Patient was instructed to follow up with MD and then return for follow ups scheduled however she failed to return calls or schedule follow-up appointments. Unable to further assess goals. Will d/c at this time and reassess in the future if medically necessary.  Thank you for this referral.     [x]Discontinue therapy: []Patient has reached or is progressing toward set goals      [x]Patient is non-compliant or has abdicated      []Due to lack of appreciable progress towards set goals    Selene Shin OT 6/28/2019 2:10 PM

## 2020-02-05 ENCOUNTER — OFFICE VISIT (OUTPATIENT)
Dept: ORTHOPEDIC SURGERY | Facility: CLINIC | Age: 55
End: 2020-02-05

## 2020-02-05 VITALS
OXYGEN SATURATION: 100 % | RESPIRATION RATE: 18 BRPM | HEIGHT: 68 IN | HEART RATE: 61 BPM | WEIGHT: 263.2 LBS | DIASTOLIC BLOOD PRESSURE: 97 MMHG | TEMPERATURE: 97.3 F | BODY MASS INDEX: 39.89 KG/M2 | SYSTOLIC BLOOD PRESSURE: 165 MMHG

## 2020-02-05 DIAGNOSIS — M65.311 TRIGGER FINGER OF RIGHT THUMB: Primary | ICD-10-CM

## 2020-02-05 NOTE — PROGRESS NOTES
Zuleyka Joya is a 47 y.o. female left handed [email or web address]. Worker's Compensation and legal considerations: not known. Vitals:    02/05/20 1456   BP: (!) 165/97   Pulse: 61   Resp: 18   Temp: 97.3 °F (36.3 °C)   TempSrc: Oral   SpO2: 100%   Weight: 263 lb 3.2 oz (119.4 kg)   Height: 5' 8\" (1.727 m)   PainSc:   8   PainLoc: Hand           Chief Complaint   Patient presents with    Hand Pain     Right Thumb     HPI: Patient comes in today for recurrence of a right trigger thumb. She is requesting an injection today. Initial HPI: Patient comes in today with complaints of right thumb pain as well as pain that goes into the wrist.  She denies any injury. Date of onset: January 2019    Injury: No    Prior Treatment:  No    Numbness/ Tingling: No    ROS: Review of Systems - General ROS: negative  Respiratory ROS: no cough, shortness of breath, or wheezing  Cardiovascular ROS: no chest pain or dyspnea on exertion  Musculoskeletal ROS: positive for - pain in thumb - right and wrist - right  Neurological ROS: negative  Dermatological ROS: negative    Past Medical History:   Diagnosis Date    Menopause        Past Surgical History:   Procedure Laterality Date    HX HYSTERECTOMY      2007    HX OOPHORECTOMY         Current Outpatient Medications   Medication Sig Dispense Refill    triamcinolone acetonide (KENALOG) 10 mg/mL injection 1 mL by Intra artICUlar route once for 1 dose. 1 Vial 0    losartan (COZAAR) 25 mg tablet Take  by mouth daily.  carvedilol (COREG) 6.25 mg tablet Take  by mouth two (2) times daily (with meals).  meloxicam (MOBIC) 15 mg tablet Take 1 Tab by mouth daily (with breakfast). 30 Tab 1    meloxicam (MOBIC) 15 mg tablet Take 1 Tab by mouth daily (with breakfast). 30 Tab 1    diclofenac (VOLTAREN XR) 100 mg ER tablet Take 1 Tab by mouth daily. 90 Tab 1    hydroCHLOROthiazide (MICROZIDE) 12.5 mg capsule Take 12.5 mg by mouth daily.       azithromycin (ZITHROMAX) 250 mg tablet Take 1 Tab by mouth See Admin Instructions. Take 2 tablets PO on day 1 then 1 tab PO every day for days 2-5 6 Tab 0    guaiFENesin-codeine (CHERATUSSIN AC) 100-10 mg/5 mL solution Take 5 mL by mouth three (3) times daily as needed for Cough. Max Daily Amount: 15 mL. 120 mL 0    ondansetron hcl (ZOFRAN) 4 mg tablet Take 1 Tab by mouth every eight (8) hours as needed for Nausea. 20 Each 0       Allergies   Allergen Reactions    Tramadol Other (comments)    Prednisone Shortness of Breath         PE:  There is minimal tenderness to palpation about the wrist and there is no positive Finkelstein or pain about the first dorsal compartment on palpation. Hand:    Examination L Digit(s) R Digit(s)   1st CMC Tenderness -  -    1st CMC Grind -  -    Sathya Nodes -  -    Heberden Nodes -  -    A1 Pulley Tenderness -  + Th   Triggering -  + Th   UCL Instability -  -    RCL Instability -  -    Lateral Stress Pain -  -    Palmar Cords -  -    Tabletop test -  -    Garrod's Pads -  -     Strength       Pinch Strength         ROM: Full      Imaging:     Previous Plain films of the left wrist as well as previous films of the left thumb does not show any acute fracture or dislocation. There are minimal degenerative changes noted. ICD-10-CM ICD-9-CM    1.  Trigger finger of right thumb M65.311 727.03 TRIAMCINOLONE ACETONIDE INJ      triamcinolone acetonide (KENALOG) 10 mg/mL injection      INJECT TENDON ORIGIN/INSERT       Plan:     Repeat Right A1 pulley injection of the thumb    Follow-up PRN    Plan was reviewed with patient, who verbalized agreement and understanding of the plan    Swetha 36 NOTE        Chart reviewed for the following:   ISourav DO, have reviewed the History, Physical and updated the Allergic reactions for [address] performed immediately prior to start of procedure:   Baldomero LORD DO, have performed the following reviews on [facility] prior to the start of the procedure:            * Patient was identified by name and date of birth   * Agreement on procedure being performed was verified  * Risks and Benefits explained to the patient  * Procedure site verified and marked as necessary  * Patient was positioned for comfort  * Consent was signed and verified     Time: 15:10      Date of procedure: 2/5/2020    Procedure performed by: Karen Gardner DO    Provider assisted by: Emmy Ryder LPN    Patient assisted by: self    How tolerated by patient: tolerated the procedure well with no complications    Post Procedural Pain Scale: 0 - No Hurt    Comments: none    Procedure:  After consent was obtained, using sterile technique the A1 pulley was prepped. Local anesthetic used: 1% lidocaine. Kenalog 5 mg and was then injected and the needle withdrawn. The procedure was well tolerated. The patient is asked to continue to rest the area for a few more days before resuming regular activities. It may be more painful for the first 1-2 days. Watch for fever, or increased swelling or persistent pain in the joint. Call or return to clinic prn if such symptoms occur or there is failure to improve as anticipated.

## 2020-02-05 NOTE — PATIENT INSTRUCTIONS
Trigger Finger: Care Instructions Overview A trigger finger is a finger stuck in a bent position. It happens when the tendon that bends and straightens the thumb or finger can't slide smoothly under the ligaments that hold the tendon against the bones. In most cases, it's caused by a bump (nodule) that forms on the tendon. The bent finger usually straightens out on its own. A trigger finger can be painful. But it normally isn't a serious problem. Trigger fingers seem to occur more in some groups of people. These groups include: · People who have diabetes or arthritis. · People who have injured their hands in the past. 
· Musicians. · People who  tools often. Rest and exercises may help your finger relax so that it can bend. You may get a corticosteroid shot. This can reduce swelling and pain. Your doctor may put a splint on your finger. It will give your finger some rest. You may need surgery if the finger keeps locking in a bent position. Follow-up care is a key part of your treatment and safety. Be sure to make and go to all appointments, and call your doctor if you are having problems. It's also a good idea to know your test results and keep a list of the medicines you take. How can you care for yourself at home? · If your doctor put a splint on your finger, wear it as directed. Don't take it off until your doctor says you can. · You may need to change your activities to avoid movements that irritate the finger. · Take your medicines exactly as prescribed. Call your doctor if you think you are having a problem with your medicine. · Ask your doctor if you can take an over-the-counter pain medicine, such as acetaminophen (Tylenol), ibuprofen (Advil, Motrin), or naproxen (Aleve). Be safe with medicines. Read and follow all instructions on the label. · If your doctor recommends exercises, do them as directed. When should you call for help? Call your doctor now or seek immediate medical care if: 
  · Your finger locks in a bent position and will not straighten.  
 Watch closely for changes in your health, and be sure to contact your doctor if: 
  · You do not get better as expected. Where can you learn more? Go to http://humera-ryder.info/. Enter M826 in the search box to learn more about \"Trigger Finger: Care Instructions. \" Current as of: June 26, 2019 Content Version: 12.2 © 1360-1814 [employer]. Care instructions adapted under license by [employer] (which disclaims liability or warranty for this information). If you have questions about a medical condition or this instruction, always ask your healthcare professional. [address] any warranty or liability for your use of this information.

## 2020-02-24 ENCOUNTER — HOSPITAL ENCOUNTER (OUTPATIENT)
Dept: GENERAL RADIOLOGY | Age: 55
Discharge: HOME | End: 2020-02-24
Payer: COMMERCIAL

## 2020-02-24 DIAGNOSIS — R06.09 DYSPNEA ON EXERTION: ICD-10-CM

## 2020-02-24 PROCEDURE — 71046 X-RAY EXAM CHEST 2 VIEWS: CPT

## 2020-05-27 DIAGNOSIS — M17.11 PRIMARY OSTEOARTHRITIS OF RIGHT KNEE: ICD-10-CM

## 2020-05-27 RX ORDER — MELOXICAM 15 MG/1
15 TABLET ORAL
Qty: 30 TAB | Refills: 1 | Status: SHIPPED | OUTPATIENT
Start: 2020-05-27 | End: 2020-08-12 | Stop reason: SDUPTHER

## 2020-05-27 NOTE — TELEPHONE ENCOUNTER
Last Visit: 5/29/19 with MD Shreya Maldonado  Next Appointment: Advised to follow-up PRN  Previous Refill Encounter(s): 5/29/19 #30 with 1 refill    Requested Prescriptions     Pending Prescriptions Disp Refills    meloxicam (Mobic) 15 mg tablet 30 Tab 1     Sig: Take 1 Tab by mouth daily (with breakfast).

## 2020-06-04 ENCOUNTER — HOSPITAL ENCOUNTER (OUTPATIENT)
Dept: MAMMOGRAPHY | Age: 55
Discharge: HOME | End: 2020-06-04
Attending: FAMILY MEDICINE
Payer: COMMERCIAL

## 2020-06-04 DIAGNOSIS — Z12.31 ENCOUNTER FOR SCREENING MAMMOGRAM FOR BREAST CANCER: ICD-10-CM

## 2020-06-04 PROCEDURE — 77067 SCR MAMMO BI INCL CAD: CPT

## 2020-08-12 DIAGNOSIS — M17.11 PRIMARY OSTEOARTHRITIS OF RIGHT KNEE: ICD-10-CM

## 2020-08-12 RX ORDER — MELOXICAM 15 MG/1
15 TABLET ORAL
Qty: 30 TAB | Refills: 1 | Status: SHIPPED | OUTPATIENT
Start: 2020-08-12 | End: 2022-09-19

## 2020-08-12 NOTE — TELEPHONE ENCOUNTER
Last Visit: 5/29/19 with MD Annabelle Jackson  Next Appointment: Advised to follow-up PRN  Previous Refill Encounter(s): 5/27/20 #30 with 1 refill    Requested Prescriptions     Pending Prescriptions Disp Refills    meloxicam (Mobic) 15 mg tablet 30 Tab 1     Sig: Take 1 Tab by mouth daily (with breakfast).

## 2021-03-23 ENCOUNTER — TRANSCRIBE ORDER (OUTPATIENT)
Dept: SCHEDULING | Age: 56
End: 2021-03-23

## 2021-03-23 DIAGNOSIS — Z12.31 SCREENING MAMMOGRAM, ENCOUNTER FOR: Primary | ICD-10-CM

## 2021-06-02 ENCOUNTER — OFFICE VISIT (OUTPATIENT)
Dept: ORTHOPEDIC SURGERY | Age: 56
End: 2021-06-02
Payer: COMMERCIAL

## 2021-06-02 VITALS
HEIGHT: 68 IN | RESPIRATION RATE: 15 BRPM | HEART RATE: 86 BPM | OXYGEN SATURATION: 100 % | BODY MASS INDEX: 39.86 KG/M2 | WEIGHT: 263 LBS

## 2021-06-02 DIAGNOSIS — M65.312 TRIGGER THUMB OF LEFT HAND: Primary | ICD-10-CM

## 2021-06-02 PROCEDURE — 20550 NJX 1 TENDON SHEATH/LIGAMENT: CPT | Performed by: ORTHOPAEDIC SURGERY

## 2021-06-02 PROCEDURE — 99213 OFFICE O/P EST LOW 20 MIN: CPT | Performed by: ORTHOPAEDIC SURGERY

## 2021-06-02 NOTE — PROGRESS NOTES
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 Worker's Compensation and legal considerations: not known. Vitals:    06/02/21 1122   Pulse: 86   Resp: 15   SpO2: 100%   Weight: 263 lb (119.3 kg)   Height: 5' 8\" (1.727 m)   PainSc:  10 - Worst pain ever   PainLoc: Finger           Chief Complaint   Patient presents with    Thumb Pain     left thumb     HPI: Patient presents today with a new problem of left trigger thumb. She has previously had her right trigger thumb injected with success. 2/5/2021 HPI: Patient comes in today for recurrence of a right trigger thumb. She is requesting an injection today. Initial HPI: Patient comes in today with complaints of right thumb pain as well as pain that goes into the wrist.  She denies any injury. Date of onset: January 2019    Injury: No    Prior Treatment:  Yes: Comment: Right thumb A1 pulley injection    Numbness/ Tingling: No    ROS: Review of Systems - General ROS: negative  Respiratory ROS: no cough, shortness of breath, or wheezing  Cardiovascular ROS: no chest pain or dyspnea on exertion  Musculoskeletal ROS: positive for - pain in thumb - right and wrist - right  Neurological ROS: negative  Dermatological ROS: negative    Past Medical History:   Diagnosis Date    Menopause        Past Surgical History:   Procedure Laterality Date    HX HYSTERECTOMY      2007    HX OOPHORECTOMY         Current Outpatient Medications   Medication Sig Dispense Refill    meloxicam (Mobic) 15 mg tablet Take 1 Tab by mouth daily (with breakfast). 30 Tab 1    losartan (COZAAR) 25 mg tablet Take  by mouth daily.  carvedilol (COREG) 6.25 mg tablet Take  by mouth two (2) times daily (with meals).  meloxicam (MOBIC) 15 mg tablet Take 1 Tab by mouth daily (with breakfast). 30 Tab 1    diclofenac (VOLTAREN XR) 100 mg ER tablet Take 1 Tab by mouth daily. 90 Tab 1    hydroCHLOROthiazide (MICROZIDE) 12.5 mg capsule Take 12.5 mg by mouth daily.       azithromycin (ZITHROMAX) 250 mg tablet Take 1 Tab by mouth See Admin Instructions. Take 2 tablets PO on day 1 then 1 tab PO every day for days 2-5 6 Tab 0    guaiFENesin-codeine (CHERATUSSIN AC) 100-10 mg/5 mL solution Take 5 mL by mouth three (3) times daily as needed for Cough. Max Daily Amount: 15 mL. 120 mL 0    ondansetron hcl (ZOFRAN) 4 mg tablet Take 1 Tab by mouth every eight (8) hours as needed for Nausea. 20 Each 0       Allergies   Allergen Reactions    Tramadol Other (comments)    Prednisone Shortness of Breath         PE:      Physical Exam  Vitals and nursing note reviewed. Constitutional:       General: She is not in acute distress. Appearance: Normal appearance. She is not ill-appearing. Cardiovascular:      Pulses: Normal pulses. Pulmonary:      Effort: Pulmonary effort is normal.   Musculoskeletal:         General: Swelling and tenderness present. No deformity or signs of injury. Cervical back: Normal range of motion and neck supple. Right lower leg: No edema. Left lower leg: No edema. Skin:     General: Skin is warm and dry. Capillary Refill: Capillary refill takes less than 2 seconds. Findings: No bruising or erythema. Neurological:      General: No focal deficit present. Mental Status: She is alert. Cranial Nerves: No cranial nerve deficit. Sensory: No sensory deficit.    Psychiatric:         Mood and Affect: Mood normal.         Behavior: Behavior normal.           Hand:    Examination L Digit(s) R Digit(s)   1st CMC Tenderness -  -    1st CMC Grind -  -    Sathya Nodes -  -    Heberden Nodes -  -    A1 Pulley Tenderness + Th -    Triggering + Th -    UCL Instability -  -    RCL Instability -  -    Lateral Stress Pain -  -    Palmar Cords -  -    Tabletop test -  -    Garrod's Pads -  -     Strength       Pinch Strength         ROM: Full      Imaging:     Previous Plain films of the left wrist as well as previous films of the left thumb does not show any acute fracture or dislocation. There are minimal degenerative changes noted. ICD-10-CM ICD-9-CM    1. Trigger thumb of left hand  M65.312 727.03 INJECT TENDON SHEATH/LIGAMENT      triamcinolone acetonide (KENALOG) 10 mg/mL injection 5 mg       Plan:     Left thumb A1 pulley injection. Follow-up and Dispositions    · Return if symptoms worsen or fail to improve. Plan was reviewed with patient, who verbalized agreement and understanding of the plan    Swetha 36 NOTE        Chart reviewed for the following:   Sourav LORD DO, have reviewed the History, Physical and updated the Allergic reactions for [address] [place] [address] performed immediately prior to start of procedure:   Dejuan LORD DO, have performed the following reviews on [facility] prior to the start of the procedure:            * Patient was identified by name and date of birth   * Agreement on procedure being performed was verified  * Risks and Benefits explained to the patient  * Procedure site verified and marked as necessary  * Patient was positioned for comfort  * Consent was signed and verified     Time: 15:10      Date of procedure: 6/2/2021    Procedure performed by: Dejuan Ontiveros DO    Provider assisted by: Madeline Kumar LPN    Patient assisted by: self    How tolerated by patient: tolerated the procedure well with no complications    Post Procedural Pain Scale: 0 - No Hurt    Comments: none    Procedure:  After consent was obtained, using sterile technique the left thumb A1 pulley was prepped. Local anesthetic used: 1% lidocaine. Kenalog 5 mg and was then injected and the needle withdrawn. The procedure was well tolerated. The patient is asked to continue to rest the area for a few more days before resuming regular activities. It may be more painful for the first 1-2 days.   Watch for fever, or increased swelling or persistent pain in the joint. Call or return to clinic prn if such symptoms occur or there is failure to improve as anticipated.

## 2021-07-07 ENCOUNTER — HOSPITAL ENCOUNTER (OUTPATIENT)
Dept: MAMMOGRAPHY | Age: 56
Discharge: HOME | End: 2021-07-07
Attending: FAMILY MEDICINE
Payer: COMMERCIAL

## 2021-07-07 DIAGNOSIS — Z12.31 SCREENING MAMMOGRAM, ENCOUNTER FOR: ICD-10-CM

## 2021-07-07 PROCEDURE — 77067 SCR MAMMO BI INCL CAD: CPT

## 2021-07-19 ENCOUNTER — HOSPITAL ENCOUNTER (OUTPATIENT)
Dept: LAB | Age: 56
Discharge: HOME | End: 2021-07-19
Payer: COMMERCIAL

## 2021-07-19 LAB
ALBUMIN SERPL-MCNC: 3.9 G/DL (ref 3.4–5)
ALBUMIN/GLOB SERPL: 1.1 {RATIO} (ref 0.8–1.7)
ALP SERPL-CCNC: 51 U/L (ref 45–117)
ALT SERPL-CCNC: 20 U/L (ref 13–56)
ANION GAP SERPL CALC-SCNC: 3 MMOL/L (ref 3–18)
AST SERPL-CCNC: 15 U/L (ref 10–38)
BILIRUB SERPL-MCNC: 0.5 MG/DL (ref 0.2–1)
BUN SERPL-MCNC: 11 MG/DL (ref 7–18)
BUN/CREAT SERPL: 16 (ref 12–20)
CALCIUM SERPL-MCNC: 9 MG/DL (ref 8.5–10.1)
CHLORIDE SERPL-SCNC: 108 MMOL/L (ref 100–111)
CHOLEST SERPL-MCNC: 237 MG/DL
CO2 SERPL-SCNC: 31 MMOL/L (ref 21–32)
CREAT SERPL-MCNC: 0.7 MG/DL (ref 0.6–1.3)
GLOBULIN SER CALC-MCNC: 3.5 G/DL (ref 2–4)
GLUCOSE SERPL-MCNC: 96 MG/DL (ref 74–99)
HBA1C MFR BLD: 6 % (ref 4.2–5.6)
HDLC SERPL-MCNC: 53 MG/DL (ref 40–60)
HDLC SERPL: 4.5 {RATIO} (ref 0–5)
LDLC SERPL CALC-MCNC: 154.6 MG/DL (ref 0–100)
LIPID PROFILE,FLP: ABNORMAL
POTASSIUM SERPL-SCNC: 3.8 MMOL/L (ref 3.5–5.5)
PROT SERPL-MCNC: 7.4 G/DL (ref 6.4–8.2)
SODIUM SERPL-SCNC: 142 MMOL/L (ref 136–145)
TRIGL SERPL-MCNC: 147 MG/DL (ref ?–150)
TSH SERPL DL<=0.05 MIU/L-ACNC: 0.94 UIU/ML (ref 0.36–3.74)
VLDLC SERPL CALC-MCNC: 29.4 MG/DL

## 2021-07-19 PROCEDURE — 36415 COLL VENOUS BLD VENIPUNCTURE: CPT

## 2021-07-19 PROCEDURE — 84443 ASSAY THYROID STIM HORMONE: CPT

## 2021-07-19 PROCEDURE — 80061 LIPID PANEL: CPT

## 2021-07-19 PROCEDURE — 80053 COMPREHEN METABOLIC PANEL: CPT

## 2021-07-19 PROCEDURE — 83036 HEMOGLOBIN GLYCOSYLATED A1C: CPT

## 2021-11-15 ENCOUNTER — HOSPITAL ENCOUNTER (OUTPATIENT)
Dept: GENERAL RADIOLOGY | Age: 56
Discharge: HOME | End: 2021-11-15
Payer: COMMERCIAL

## 2021-11-15 ENCOUNTER — TRANSCRIBE ORDER (OUTPATIENT)
Dept: REGISTRATION | Age: 56
End: 2021-11-15

## 2021-11-15 DIAGNOSIS — R05.9 COUGH: Primary | ICD-10-CM

## 2021-11-15 DIAGNOSIS — R05.9 COUGH: ICD-10-CM

## 2021-11-15 PROCEDURE — 71046 X-RAY EXAM CHEST 2 VIEWS: CPT

## 2021-12-01 ENCOUNTER — HOSPITAL ENCOUNTER (OUTPATIENT)
Dept: GENERAL RADIOLOGY | Age: 56
Discharge: HOME | End: 2021-12-01
Payer: COMMERCIAL

## 2021-12-01 ENCOUNTER — TRANSCRIBE ORDER (OUTPATIENT)
Dept: REGISTRATION | Age: 56
End: 2021-12-01

## 2021-12-01 ENCOUNTER — HOSPITAL ENCOUNTER (OUTPATIENT)
Dept: LAB | Age: 56
Discharge: HOME | End: 2021-12-01
Payer: COMMERCIAL

## 2021-12-01 DIAGNOSIS — U07.1 CLINICAL DIAGNOSIS OF SEVERE ACUTE RESPIRATORY SYNDROME CORONAVIRUS 2 (SARS-COV-2) DISEASE: ICD-10-CM

## 2021-12-01 DIAGNOSIS — U07.1 CLINICAL DIAGNOSIS OF SEVERE ACUTE RESPIRATORY SYNDROME CORONAVIRUS 2 (SARS-COV-2) DISEASE: Primary | ICD-10-CM

## 2021-12-01 LAB
ANION GAP SERPL CALC-SCNC: 7 MMOL/L (ref 3–18)
BUN SERPL-MCNC: 10 MG/DL (ref 7–18)
BUN/CREAT SERPL: 11 (ref 12–20)
CALCIUM SERPL-MCNC: 9.8 MG/DL (ref 8.5–10.1)
CHLORIDE SERPL-SCNC: 104 MMOL/L (ref 100–111)
CHOLEST SERPL-MCNC: 274 MG/DL
CO2 SERPL-SCNC: 30 MMOL/L (ref 21–32)
CREAT SERPL-MCNC: 0.93 MG/DL (ref 0.6–1.3)
GLUCOSE SERPL-MCNC: 98 MG/DL (ref 74–99)
HDLC SERPL-MCNC: 49 MG/DL (ref 40–60)
HDLC SERPL: 5.6 {RATIO} (ref 0–5)
LDLC SERPL CALC-MCNC: 187 MG/DL (ref 0–100)
LIPID PROFILE,FLP: ABNORMAL
POTASSIUM SERPL-SCNC: 3.4 MMOL/L (ref 3.5–5.5)
SODIUM SERPL-SCNC: 141 MMOL/L (ref 136–145)
TRIGL SERPL-MCNC: 190 MG/DL (ref ?–150)
VLDLC SERPL CALC-MCNC: 38 MG/DL

## 2021-12-01 PROCEDURE — 80048 BASIC METABOLIC PNL TOTAL CA: CPT

## 2021-12-01 PROCEDURE — 71046 X-RAY EXAM CHEST 2 VIEWS: CPT

## 2021-12-01 PROCEDURE — 36415 COLL VENOUS BLD VENIPUNCTURE: CPT

## 2021-12-01 PROCEDURE — 80061 LIPID PANEL: CPT

## 2022-01-13 ENCOUNTER — OFFICE VISIT (OUTPATIENT)
Dept: ORTHOPEDIC SURGERY | Age: 57
End: 2022-01-13
Payer: COMMERCIAL

## 2022-01-13 VITALS
TEMPERATURE: 97.3 F | HEIGHT: 68 IN | BODY MASS INDEX: 37.13 KG/M2 | OXYGEN SATURATION: 100 % | HEART RATE: 73 BPM | WEIGHT: 245 LBS

## 2022-01-13 DIAGNOSIS — M65.312 TRIGGER FINGER OF LEFT THUMB: Primary | ICD-10-CM

## 2022-01-13 PROCEDURE — 20600 DRAIN/INJ JOINT/BURSA W/O US: CPT | Performed by: ORTHOPAEDIC SURGERY

## 2022-01-13 RX ORDER — VALSARTAN AND HYDROCHLOROTHIAZIDE 80; 12.5 MG/1; MG/1
TABLET, FILM COATED ORAL
COMMUNITY
Start: 2022-01-05

## 2022-01-13 NOTE — PROGRESS NOTES
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 Vitals:    01/13/22 1452   Pulse: 73   Temp: 97.3 °F (36.3 °C)   SpO2: 100%   Weight: 245 lb (111.1 kg)   Height: 5' 8\" (1.727 m)   PainSc:  10 - Worst pain ever   PainLoc: Hand           Chief Complaint   Patient presents with    Thumb Pain     left, request injection       PI: Patient returns today due to recurrence of left trigger thumb. She is requesting an injection and might be interested in surgery in the future. 6/2/2021 HPI: Patient presents today with a new problem of left trigger thumb. She has previously had her right trigger thumb injected with success. 2/5/2021 HPI: Patient comes in today for recurrence of a right trigger thumb. She is requesting an injection today. Initial HPI: Patient comes in today with complaints of right thumb pain as well as pain that goes into the wrist.  She denies any injury. Date of onset: January 2019    Injury: No    Prior Treatment:  Yes: Comment: Right thumb A1 pulley injection. Left thumb A1 pulley injection. Numbness/ Tingling: No    ROS: Review of Systems - General ROS: negative  Respiratory ROS: no cough, shortness of breath, or wheezing  Cardiovascular ROS: no chest pain or dyspnea on exertion  Musculoskeletal ROS: positive for - pain in thumb - right and wrist - right  Neurological ROS: negative  Dermatological ROS: negative    Past Medical History:   Diagnosis Date    Chronic pain of left thumb     Menopause        Past Surgical History:   Procedure Laterality Date    HX HYSTERECTOMY      2007    HX OOPHORECTOMY         Current Outpatient Medications   Medication Sig Dispense Refill    valsartan-hydroCHLOROthiazide (DIOVAN-HCT) 80-12.5 mg per tablet       meloxicam (Mobic) 15 mg tablet Take 1 Tab by mouth daily (with breakfast). 30 Tab 1    losartan (COZAAR) 25 mg tablet Take  by mouth daily.  (Patient not taking: Reported on 1/13/2022)      carvedilol (COREG) 6.25 mg tablet Take  by mouth two (2) times daily (with meals).  meloxicam (MOBIC) 15 mg tablet Take 1 Tab by mouth daily (with breakfast). 30 Tab 1    diclofenac (VOLTAREN XR) 100 mg ER tablet Take 1 Tab by mouth daily. 90 Tab 1    hydroCHLOROthiazide (MICROZIDE) 12.5 mg capsule Take 12.5 mg by mouth daily.  azithromycin (ZITHROMAX) 250 mg tablet Take 1 Tab by mouth See Admin Instructions. Take 2 tablets PO on day 1 then 1 tab PO every day for days 2-5 (Patient not taking: Reported on 1/13/2022) 6 Tab 0    guaiFENesin-codeine (CHERATUSSIN AC) 100-10 mg/5 mL solution Take 5 mL by mouth three (3) times daily as needed for Cough. Max Daily Amount: 15 mL. (Patient not taking: Reported on 1/13/2022) 120 mL 0    ondansetron hcl (ZOFRAN) 4 mg tablet Take 1 Tab by mouth every eight (8) hours as needed for Nausea. (Patient not taking: Reported on 1/13/2022) 20 Each 0     Current Facility-Administered Medications   Medication Dose Route Frequency Provider Last Rate Last Admin    triamcinolone acetonide (KENALOG) 10 mg/mL injection 10 mg  10 mg Other ONCE Sourav Wong, DO           Allergies   Allergen Reactions    Tramadol Other (comments)    Prednisone Shortness of Breath         PE:      Physical Exam  Vitals and nursing note reviewed. Constitutional:       General: She is not in acute distress. Appearance: Normal appearance. She is not ill-appearing. Cardiovascular:      Pulses: Normal pulses. Pulmonary:      Effort: Pulmonary effort is normal. No respiratory distress. Musculoskeletal:         General: Swelling and tenderness present. No deformity or signs of injury. Cervical back: Normal range of motion and neck supple. Right lower leg: No edema. Left lower leg: No edema. Skin:     General: Skin is warm and dry. Capillary Refill: Capillary refill takes less than 2 seconds. Findings: No bruising or erythema. Neurological:      General: No focal deficit present. Mental Status: She is alert. Cranial Nerves: No cranial nerve deficit. Sensory: No sensory deficit. Psychiatric:         Mood and Affect: Mood normal.         Behavior: Behavior normal.           Hand:    Examination L Digit(s) R Digit(s)   1st CMC Tenderness -  -    1st CMC Grind -  -    Sathya Nodes -  -    Heberden Nodes -  -    A1 Pulley Tenderness + Th -    Triggering + Th -    UCL Instability -  -    RCL Instability -  -    Lateral Stress Pain -  -    Palmar Cords -  -    Tabletop test -  -    Garrod's Pads -  -     Strength       Pinch Strength         ROM: Full      Imaging:     Previous Plain films of the left wrist as well as previous films of the left thumb does not show any acute fracture or dislocation. There are minimal degenerative changes noted. ICD-10-CM ICD-9-CM    1. Trigger finger of left thumb  M65.312 727.03 triamcinolone acetonide (KENALOG) 10 mg/mL injection 10 mg      DRAIN/INJECT SMALL JOINT/BURSA       Plan:     Repeat left thumb A1 pulley injection. Follow-up and Dispositions    · Return if symptoms worsen or fail to improve.          Plan was reviewed with patient, who verbalized agreement and understanding of the plan    Swetha 36 NOTE        Chart reviewed for the following:   Sourav LORD DO, have reviewed the History, Physical and updated the Allergic reactions for [address] [place] [address] performed immediately prior to start of procedure:   Sourav LORD DO, have performed the following reviews on [facility] prior to the start of the procedure:            * Patient was identified by name and date of birth   * Agreement on procedure being performed was verified  * Risks and Benefits explained to the patient  * Procedure site verified and marked as necessary  * Patient was positioned for comfort  * Consent was signed and verified     Time:  15:12      Date of procedure: 1/13/2022    Procedure performed by: Barry Serna DO    Provider assisted by: Johnnie Sainz LPN    Patient assisted by: self    How tolerated by patient: tolerated the procedure well with no complications    Post Procedural Pain Scale: 0 - No Hurt    Comments: none    Procedure:  After consent was obtained, using sterile technique the left thumb A1 pulley was prepped. Local anesthetic used: 1% lidocaine. Kenalog 5 mg and was then injected and the needle withdrawn. The procedure was well tolerated. The patient is asked to continue to rest the area for a few more days before resuming regular activities. It may be more painful for the first 1-2 days. Watch for fever, or increased swelling or persistent pain in the joint. Call or return to clinic prn if such symptoms occur or there is failure to improve as anticipated.

## 2022-02-12 ENCOUNTER — HOSPITAL ENCOUNTER (OUTPATIENT)
Dept: GENERAL RADIOLOGY | Age: 57
Discharge: HOME | End: 2022-02-12
Payer: COMMERCIAL

## 2022-02-12 ENCOUNTER — HOSPITAL ENCOUNTER (OUTPATIENT)
Dept: LAB | Age: 57
Discharge: HOME | End: 2022-02-12
Payer: COMMERCIAL

## 2022-02-12 DIAGNOSIS — M25.561 RIGHT KNEE PAIN: ICD-10-CM

## 2022-02-12 LAB
ALBUMIN SERPL-MCNC: 3.8 G/DL (ref 3.4–5)
ALBUMIN/GLOB SERPL: 1.2 {RATIO} (ref 0.8–1.7)
ALP SERPL-CCNC: 46 U/L (ref 45–117)
ALT SERPL-CCNC: 21 U/L (ref 13–56)
AST SERPL-CCNC: 11 U/L (ref 10–38)
BILIRUB DIRECT SERPL-MCNC: 0.2 MG/DL (ref 0–0.2)
BILIRUB SERPL-MCNC: 0.8 MG/DL (ref 0.2–1)
CHOLEST SERPL-MCNC: 155 MG/DL
GLOBULIN SER CALC-MCNC: 3.3 G/DL (ref 2–4)
HBA1C MFR BLD: 5.6 % (ref 4.2–5.6)
HDLC SERPL-MCNC: 73 MG/DL (ref 40–60)
HDLC SERPL: 2.1 {RATIO} (ref 0–5)
LDLC SERPL CALC-MCNC: 62 MG/DL (ref 0–100)
LIPID PROFILE,FLP: ABNORMAL
PROT SERPL-MCNC: 7.1 G/DL (ref 6.4–8.2)
TRIGL SERPL-MCNC: 100 MG/DL (ref ?–150)
VLDLC SERPL CALC-MCNC: 20 MG/DL

## 2022-02-12 PROCEDURE — 80076 HEPATIC FUNCTION PANEL: CPT

## 2022-02-12 PROCEDURE — 73560 X-RAY EXAM OF KNEE 1 OR 2: CPT

## 2022-02-12 PROCEDURE — 80061 LIPID PANEL: CPT

## 2022-02-12 PROCEDURE — 83036 HEMOGLOBIN GLYCOSYLATED A1C: CPT

## 2022-02-12 PROCEDURE — 36415 COLL VENOUS BLD VENIPUNCTURE: CPT

## 2022-03-18 PROBLEM — E66.01 OBESITY, MORBID (HCC): Status: ACTIVE | Noted: 2018-02-22

## 2022-05-25 ENCOUNTER — TRANSCRIBE ORDER (OUTPATIENT)
Dept: SCHEDULING | Age: 57
End: 2022-05-25

## 2022-05-25 DIAGNOSIS — Z12.31 VISIT FOR SCREENING MAMMOGRAM: Primary | ICD-10-CM

## 2022-06-29 ENCOUNTER — HOSPITAL ENCOUNTER (OUTPATIENT)
Dept: LAB | Age: 57
Discharge: HOME | End: 2022-06-29
Payer: COMMERCIAL

## 2022-06-29 LAB
ALBUMIN SERPL-MCNC: 3.8 G/DL (ref 3.4–5)
ALBUMIN/GLOB SERPL: 1.1 {RATIO} (ref 0.8–1.7)
ALP SERPL-CCNC: 48 U/L (ref 45–117)
ALT SERPL-CCNC: 20 U/L (ref 13–56)
ANION GAP SERPL CALC-SCNC: 4 MMOL/L (ref 3–18)
AST SERPL-CCNC: 12 U/L (ref 10–38)
BILIRUB SERPL-MCNC: 0.5 MG/DL (ref 0.2–1)
BUN SERPL-MCNC: 12 MG/DL (ref 7–18)
BUN/CREAT SERPL: 14 (ref 12–20)
CALCIUM SERPL-MCNC: 9.7 MG/DL (ref 8.5–10.1)
CHLORIDE SERPL-SCNC: 106 MMOL/L (ref 100–111)
CO2 SERPL-SCNC: 32 MMOL/L (ref 21–32)
CREAT SERPL-MCNC: 0.84 MG/DL (ref 0.6–1.3)
ERYTHROCYTE [DISTWIDTH] IN BLOOD BY AUTOMATED COUNT: 14.6 % (ref 11.6–14.5)
GLOBULIN SER CALC-MCNC: 3.4 G/DL (ref 2–4)
GLUCOSE SERPL-MCNC: 92 MG/DL (ref 74–99)
HCT VFR BLD AUTO: 40.7 % (ref 35–45)
HGB BLD-MCNC: 12.8 G/DL (ref 12–16)
MCH RBC QN AUTO: 27.3 PG (ref 24–34)
MCHC RBC AUTO-ENTMCNC: 31.4 G/DL (ref 31–37)
MCV RBC AUTO: 86.8 FL (ref 78–100)
NRBC # BLD: 0 K/UL (ref 0–0.01)
NRBC BLD-RTO: 0 PER 100 WBC
PLATELET # BLD AUTO: 216 K/UL (ref 135–420)
PMV BLD AUTO: 12.3 FL (ref 9.2–11.8)
POTASSIUM SERPL-SCNC: 4.2 MMOL/L (ref 3.5–5.5)
PROT SERPL-MCNC: 7.2 G/DL (ref 6.4–8.2)
RBC # BLD AUTO: 4.69 M/UL (ref 4.2–5.3)
SODIUM SERPL-SCNC: 142 MMOL/L (ref 136–145)
WBC # BLD AUTO: 6.7 K/UL (ref 4.6–13.2)

## 2022-06-29 PROCEDURE — 93005 ELECTROCARDIOGRAM TRACING: CPT

## 2022-06-29 PROCEDURE — 80053 COMPREHEN METABOLIC PANEL: CPT

## 2022-06-29 PROCEDURE — 85027 COMPLETE CBC AUTOMATED: CPT

## 2022-06-29 PROCEDURE — 36415 COLL VENOUS BLD VENIPUNCTURE: CPT

## 2022-06-30 LAB
ATRIAL RATE: 54 BPM
CALCULATED P AXIS, ECG09: 55 DEGREES
CALCULATED R AXIS, ECG10: -39 DEGREES
CALCULATED T AXIS, ECG11: 14 DEGREES
DIAGNOSIS, 93000: NORMAL
P-R INTERVAL, ECG05: 136 MS
Q-T INTERVAL, ECG07: 476 MS
QRS DURATION, ECG06: 82 MS
QTC CALCULATION (BEZET), ECG08: 451 MS
VENTRICULAR RATE, ECG03: 54 BPM

## 2022-07-09 ENCOUNTER — HOSPITAL ENCOUNTER (OUTPATIENT)
Dept: MAMMOGRAPHY | Age: 57
Discharge: HOME | End: 2022-07-09
Attending: NURSE PRACTITIONER
Payer: COMMERCIAL

## 2022-07-09 DIAGNOSIS — Z12.31 VISIT FOR SCREENING MAMMOGRAM: ICD-10-CM

## 2022-07-09 PROCEDURE — 77067 SCR MAMMO BI INCL CAD: CPT

## 2022-09-19 ENCOUNTER — OFFICE VISIT (OUTPATIENT)
Dept: ORTHOPEDIC SURGERY | Age: 57
End: 2022-09-19
Payer: COMMERCIAL

## 2022-09-19 VITALS
WEIGHT: 233 LBS | HEIGHT: 68 IN | OXYGEN SATURATION: 100 % | TEMPERATURE: 97.1 F | RESPIRATION RATE: 14 BRPM | HEART RATE: 60 BPM | BODY MASS INDEX: 35.31 KG/M2

## 2022-09-19 DIAGNOSIS — M65.312 TRIGGER THUMB OF LEFT HAND: Primary | ICD-10-CM

## 2022-09-19 PROCEDURE — 20550 NJX 1 TENDON SHEATH/LIGAMENT: CPT | Performed by: ORTHOPAEDIC SURGERY

## 2022-09-19 RX ORDER — LOSARTAN POTASSIUM AND HYDROCHLOROTHIAZIDE 25; 100 MG/1; MG/1
TABLET ORAL
COMMUNITY

## 2022-09-19 NOTE — PROGRESS NOTES
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 Worker's Compensation and legal considerations: not known. Vitals:    09/19/22 0956   Pulse: 60   Resp: 14   Temp: 97.1 °F (36.2 °C)   TempSrc: Temporal   SpO2: 100%   Weight: 233 lb (105.7 kg)   Height: 5' 8\" (1.727 m)   PainSc:   0 - No pain           Chief Complaint   Patient presents with    Thumb Pain     left       HPI: Patient presents today due to recurrence of left trigger thumb. She is requesting a repeat injection today. 1/13/2022 HPI: Patient returns today due to recurrence of left trigger thumb. She is requesting an injection and might be interested in surgery in the future. 6/2/2021 HPI: Patient presents today with a new problem of left trigger thumb. She has previously had her right trigger thumb injected with success. 2/5/2021 HPI: Patient comes in today for recurrence of a right trigger thumb. She is requesting an injection today. Initial HPI: Patient comes in today with complaints of right thumb pain as well as pain that goes into the wrist.  She denies any injury. Date of onset: January 2019    Injury: No    Prior Treatment:  Yes: Comment: Right thumb A1 pulley injection. Left thumb A1 pulley injection.     Numbness/ Tingling: No    ROS: Review of Systems - General ROS: negative  Respiratory ROS: no cough, shortness of breath, or wheezing  Cardiovascular ROS: no chest pain or dyspnea on exertion  Musculoskeletal ROS: positive for - pain in thumb - right and wrist - right  Neurological ROS: negative  Dermatological ROS: negative    Past Medical History:   Diagnosis Date    Chronic pain of left thumb     Menopause        Past Surgical History:   Procedure Laterality Date    HX HYSTERECTOMY      2007    HX OOPHORECTOMY         Current Outpatient Medications   Medication Sig Dispense Refill    losartan-hydroCHLOROthiazide (HYZAAR) 100-25 mg per tablet 80-12.5mg      valsartan-hydroCHLOROthiazide (DIOVAN-HCT) 80-12.5 mg per tablet  (Patient not taking: Reported on 9/19/2022)      losartan (COZAAR) 25 mg tablet Take  by mouth daily. (Patient not taking: Reported on 9/19/2022)      carvedilol (COREG) 6.25 mg tablet Take  by mouth two (2) times daily (with meals). (Patient not taking: Reported on 9/19/2022)      meloxicam (MOBIC) 15 mg tablet Take 1 Tab by mouth daily (with breakfast). (Patient not taking: Reported on 9/19/2022) 30 Tab 1    diclofenac (VOLTAREN XR) 100 mg ER tablet Take 1 Tab by mouth daily. (Patient not taking: Reported on 9/19/2022) 90 Tab 1    hydroCHLOROthiazide (MICROZIDE) 12.5 mg capsule Take 12.5 mg by mouth daily. (Patient not taking: Reported on 9/19/2022)      azithromycin (ZITHROMAX) 250 mg tablet Take 1 Tab by mouth See Admin Instructions. Take 2 tablets PO on day 1 then 1 tab PO every day for days 2-5 (Patient not taking: No sig reported) 6 Tab 0    guaiFENesin-codeine (CHERATUSSIN AC) 100-10 mg/5 mL solution Take 5 mL by mouth three (3) times daily as needed for Cough. Max Daily Amount: 15 mL. (Patient not taking: No sig reported) 120 mL 0    ondansetron hcl (ZOFRAN) 4 mg tablet Take 1 Tab by mouth every eight (8) hours as needed for Nausea. (Patient not taking: No sig reported) 20 Each 0     Current Facility-Administered Medications   Medication Dose Route Frequency Provider Last Rate Last Admin    triamcinolone acetonide (KENALOG) 10 mg/mL injection 5 mg  5 mg Other ONCE Sourav Wong DO           Allergies   Allergen Reactions    Tramadol Other (comments)    Prednisone Shortness of Breath         PE:      Physical Exam  Vitals and nursing note reviewed. Constitutional:       General: She is not in acute distress. Appearance: Normal appearance. She is not ill-appearing. Cardiovascular:      Pulses: Normal pulses. Pulmonary:      Effort: Pulmonary effort is normal. No respiratory distress. Musculoskeletal:         General: Swelling and tenderness present. No deformity or signs of injury. Cervical back: Normal range of motion and neck supple. Right lower leg: No edema. Left lower leg: No edema. Skin:     General: Skin is warm and dry. Capillary Refill: Capillary refill takes less than 2 seconds. Findings: No bruising or erythema. Neurological:      General: No focal deficit present. Mental Status: She is alert. Cranial Nerves: No cranial nerve deficit. Sensory: No sensory deficit. Psychiatric:         Mood and Affect: Mood normal.         Behavior: Behavior normal.         Hand:    Examination L Digit(s) R Digit(s)   1st CMC Tenderness -  -    1st CMC Grind -  -    Sathya Nodes -  -    Heberden Nodes -  -    A1 Pulley Tenderness + Th -    Triggering + Th -    UCL Instability -  -    RCL Instability -  -    Lateral Stress Pain -  -    Palmar Cords -  -    Tabletop test -  -    Garrod's Pads -  -     Strength       Pinch Strength         ROM: Full      Imaging:     Previous Plain films of the left wrist as well as previous films of the left thumb does not show any acute fracture or dislocation. There are minimal degenerative changes noted. ICD-10-CM ICD-9-CM    1. Trigger thumb of left hand  M65.312 727.03 INJECT TENDON SHEATH/LIGAMENT      triamcinolone acetonide (KENALOG) 10 mg/mL injection 5 mg          Plan:     Repeat left thumb A1 pulley injection. Follow-up and Dispositions    Return if symptoms worsen or fail to improve.          Plan was reviewed with patient, who verbalized agreement and understanding of the plan    Swetha 36 NOTE        Chart reviewed for the following:   Sourav LORD DO, have reviewed the History, Physical and updated the Allergic reactions for [address] [place] [address] performed immediately prior to start of procedure:   Sourav LORD DO, have performed the following reviews on [facility] prior to the start of the procedure: * Patient was identified by name and date of birth   * Agreement on procedure being performed was verified  * Risks and Benefits explained to the patient  * Procedure site verified and marked as necessary  * Patient was positioned for comfort  * Consent was signed and verified     Time:  10:17 AM      Date of procedure: 9/19/2022    Procedure performed by: Nany Clarke DO    Provider assisted by: Traci Llanos MA    Patient assisted by: self    How tolerated by patient: tolerated the procedure well with no complications    Post Procedural Pain Scale: 0 - No Hurt    Comments: none    Procedure:  After consent was obtained, using sterile technique the left thumb A1 pulley was prepped. Local anesthetic used: 1% lidocaine. Kenalog 5 mg and was then injected and the needle withdrawn. The procedure was well tolerated. The patient is asked to continue to rest the area for a few more days before resuming regular activities. It may be more painful for the first 1-2 days. Watch for fever, or increased swelling or persistent pain in the joint. Call or return to clinic prn if such symptoms occur or there is failure to improve as anticipated.

## 2022-12-10 ENCOUNTER — HOSPITAL ENCOUNTER (OUTPATIENT)
Dept: LAB | Age: 57
Discharge: HOME | End: 2022-12-10
Payer: COMMERCIAL

## 2022-12-10 LAB
ALBUMIN SERPL-MCNC: 3.6 G/DL (ref 3.4–5)
ALBUMIN/GLOB SERPL: 1 {RATIO} (ref 0.8–1.7)
ALP SERPL-CCNC: 54 U/L (ref 45–117)
ALT SERPL-CCNC: 17 U/L (ref 13–56)
ANION GAP SERPL CALC-SCNC: 4 MMOL/L (ref 3–18)
AST SERPL-CCNC: 11 U/L (ref 10–38)
BILIRUB SERPL-MCNC: 0.4 MG/DL (ref 0.2–1)
BUN SERPL-MCNC: 15 MG/DL (ref 7–18)
BUN/CREAT SERPL: 16 (ref 12–20)
CALCIUM SERPL-MCNC: 9.4 MG/DL (ref 8.5–10.1)
CHLORIDE SERPL-SCNC: 108 MMOL/L (ref 100–111)
CHOLEST SERPL-MCNC: 204 MG/DL
CO2 SERPL-SCNC: 28 MMOL/L (ref 21–32)
CREAT SERPL-MCNC: 0.93 MG/DL (ref 0.6–1.3)
GLOBULIN SER CALC-MCNC: 3.6 G/DL (ref 2–4)
GLUCOSE SERPL-MCNC: 108 MG/DL (ref 74–99)
HBA1C MFR BLD: 5.8 % (ref 4.2–5.6)
HDLC SERPL-MCNC: 47 MG/DL (ref 40–60)
HDLC SERPL: 4.3 {RATIO} (ref 0–5)
LDLC SERPL CALC-MCNC: 119.8 MG/DL (ref 0–100)
LIPID PROFILE,FLP: ABNORMAL
POTASSIUM SERPL-SCNC: 3.8 MMOL/L (ref 3.5–5.5)
PROT SERPL-MCNC: 7.2 G/DL (ref 6.4–8.2)
SODIUM SERPL-SCNC: 140 MMOL/L (ref 136–145)
TRIGL SERPL-MCNC: 186 MG/DL (ref ?–150)
TSH SERPL DL<=0.05 MIU/L-ACNC: 0.53 UIU/ML (ref 0.36–3.74)
VLDLC SERPL CALC-MCNC: 37.2 MG/DL

## 2022-12-10 PROCEDURE — 36415 COLL VENOUS BLD VENIPUNCTURE: CPT

## 2022-12-10 PROCEDURE — 83036 HEMOGLOBIN GLYCOSYLATED A1C: CPT

## 2022-12-10 PROCEDURE — 84443 ASSAY THYROID STIM HORMONE: CPT

## 2022-12-10 PROCEDURE — 80053 COMPREHEN METABOLIC PANEL: CPT

## 2022-12-10 PROCEDURE — 80061 LIPID PANEL: CPT

## 2023-05-02 ENCOUNTER — HOSPITAL ENCOUNTER (OUTPATIENT)
Facility: HOSPITAL | Age: 58
Discharge: HOME | End: 2023-05-05
Payer: COMMERCIAL

## 2023-05-02 LAB
ALBUMIN SERPL-MCNC: 3.6 G/DL (ref 3.4–5)
ALBUMIN/GLOB SERPL: 1.1 (ref 0.8–1.7)
ALP SERPL-CCNC: 51 U/L (ref 45–117)
ALT SERPL-CCNC: 20 U/L (ref 13–56)
AST SERPL-CCNC: 13 U/L (ref 10–38)
BILIRUB DIRECT SERPL-MCNC: 0.1 MG/DL (ref 0–0.2)
BILIRUB SERPL-MCNC: 0.6 MG/DL (ref 0.2–1)
CHOLEST SERPL-MCNC: 224 MG/DL
GLOBULIN SER CALC-MCNC: 3.4 G/DL (ref 2–4)
HDLC SERPL-MCNC: 53 MG/DL (ref 40–60)
HDLC SERPL: 4.2 (ref 0–5)
LDLC SERPL CALC-MCNC: 147.8 MG/DL (ref 0–100)
LIPID PANEL: ABNORMAL
PROT SERPL-MCNC: 7 G/DL (ref 6.4–8.2)
TRIGL SERPL-MCNC: 116 MG/DL
VLDLC SERPL CALC-MCNC: 23.2 MG/DL

## 2023-05-02 PROCEDURE — 80076 HEPATIC FUNCTION PANEL: CPT

## 2023-05-02 PROCEDURE — 80061 LIPID PANEL: CPT

## 2023-05-02 PROCEDURE — 36415 COLL VENOUS BLD VENIPUNCTURE: CPT

## 2023-07-10 ENCOUNTER — HOSPITAL ENCOUNTER (OUTPATIENT)
Facility: HOSPITAL | Age: 58
Discharge: HOME | End: 2023-07-13
Payer: COMMERCIAL

## 2023-07-10 DIAGNOSIS — Z12.31 VISIT FOR SCREENING MAMMOGRAM: ICD-10-CM

## 2023-07-10 PROCEDURE — 77067 SCR MAMMO BI INCL CAD: CPT

## 2023-09-27 ENCOUNTER — HOSPITAL ENCOUNTER (OUTPATIENT)
Facility: HOSPITAL | Age: 58
Discharge: HOME | End: 2023-09-30
Payer: COMMERCIAL

## 2023-09-27 LAB
ALBUMIN SERPL-MCNC: 3.6 G/DL (ref 3.4–5)
ALBUMIN/GLOB SERPL: 0.9 (ref 0.8–1.7)
ALP SERPL-CCNC: 52 U/L (ref 45–117)
ALT SERPL-CCNC: 21 U/L (ref 13–56)
AST SERPL-CCNC: 15 U/L (ref 10–38)
BILIRUB DIRECT SERPL-MCNC: 0.2 MG/DL (ref 0–0.2)
BILIRUB SERPL-MCNC: 0.7 MG/DL (ref 0.2–1)
CHOLEST SERPL-MCNC: 210 MG/DL
EST. AVERAGE GLUCOSE BLD GHB EST-MCNC: 126 MG/DL
GLOBULIN SER CALC-MCNC: 3.8 G/DL (ref 2–4)
HBA1C MFR BLD: 6 % (ref 4.2–5.6)
HDLC SERPL-MCNC: 48 MG/DL (ref 40–60)
HDLC SERPL: 4.4 (ref 0–5)
LDLC SERPL CALC-MCNC: 138 MG/DL (ref 0–100)
LIPID PANEL: ABNORMAL
PROT SERPL-MCNC: 7.4 G/DL (ref 6.4–8.2)
TRIGL SERPL-MCNC: 120 MG/DL
VLDLC SERPL CALC-MCNC: 24 MG/DL

## 2023-09-27 PROCEDURE — 80076 HEPATIC FUNCTION PANEL: CPT

## 2023-09-27 PROCEDURE — 36415 COLL VENOUS BLD VENIPUNCTURE: CPT

## 2023-09-27 PROCEDURE — 80061 LIPID PANEL: CPT

## 2023-09-27 PROCEDURE — 83036 HEMOGLOBIN GLYCOSYLATED A1C: CPT

## 2024-01-03 ENCOUNTER — HOSPITAL ENCOUNTER (EMERGENCY)
Facility: HOSPITAL | Age: 59
Discharge: HOME | End: 2024-01-03
Attending: EMERGENCY MEDICINE
Payer: COMMERCIAL

## 2024-01-03 ENCOUNTER — APPOINTMENT (OUTPATIENT)
Facility: HOSPITAL | Age: 59
End: 2024-01-03
Payer: COMMERCIAL

## 2024-01-03 VITALS
DIASTOLIC BLOOD PRESSURE: 101 MMHG | HEART RATE: 63 BPM | TEMPERATURE: 97.9 F | WEIGHT: 220 LBS | OXYGEN SATURATION: 100 % | SYSTOLIC BLOOD PRESSURE: 166 MMHG | BODY MASS INDEX: 32.58 KG/M2 | HEIGHT: 69 IN | RESPIRATION RATE: 10 BRPM

## 2024-01-03 DIAGNOSIS — R07.9 CENTRAL CHEST PAIN: ICD-10-CM

## 2024-01-03 DIAGNOSIS — I15.8 OTHER SECONDARY HYPERTENSION: ICD-10-CM

## 2024-01-03 DIAGNOSIS — E87.6 ACUTE HYPOKALEMIA: ICD-10-CM

## 2024-01-03 DIAGNOSIS — G44.209 ACUTE NON INTRACTABLE TENSION-TYPE HEADACHE: Primary | ICD-10-CM

## 2024-01-03 LAB
ALBUMIN SERPL-MCNC: 4.1 G/DL (ref 3.4–5)
ALBUMIN/GLOB SERPL: 1.1 (ref 0.8–1.7)
ALP SERPL-CCNC: 53 U/L (ref 45–117)
ALT SERPL-CCNC: 26 U/L (ref 13–56)
ANION GAP SERPL CALC-SCNC: 8 MMOL/L (ref 3–18)
AST SERPL-CCNC: 17 U/L (ref 10–38)
BASOPHILS # BLD: 0.1 K/UL (ref 0–0.1)
BASOPHILS NFR BLD: 1 % (ref 0–2)
BILIRUB SERPL-MCNC: 0.6 MG/DL (ref 0.2–1)
BUN SERPL-MCNC: 10 MG/DL (ref 7–18)
BUN/CREAT SERPL: 10 (ref 12–20)
CALCIUM SERPL-MCNC: 9.6 MG/DL (ref 8.5–10.1)
CHLORIDE SERPL-SCNC: 104 MMOL/L (ref 100–111)
CO2 SERPL-SCNC: 29 MMOL/L (ref 21–32)
CREAT SERPL-MCNC: 1.02 MG/DL (ref 0.6–1.3)
DIFFERENTIAL METHOD BLD: ABNORMAL
EKG ATRIAL RATE: 81 BPM
EKG DIAGNOSIS: NORMAL
EKG P AXIS: 50 DEGREES
EKG P-R INTERVAL: 144 MS
EKG Q-T INTERVAL: 468 MS
EKG QRS DURATION: 90 MS
EKG QTC CALCULATION (BAZETT): 490 MS
EKG R AXIS: -44 DEGREES
EKG T AXIS: 27 DEGREES
EKG VENTRICULAR RATE: 66 BPM
EOSINOPHIL # BLD: 0.5 K/UL (ref 0–0.4)
EOSINOPHIL NFR BLD: 7 % (ref 0–5)
ERYTHROCYTE [DISTWIDTH] IN BLOOD BY AUTOMATED COUNT: 13.9 % (ref 11.6–14.5)
GLOBULIN SER CALC-MCNC: 3.9 G/DL (ref 2–4)
GLUCOSE SERPL-MCNC: 133 MG/DL (ref 74–99)
HCT VFR BLD AUTO: 41.8 % (ref 35–45)
HGB BLD-MCNC: 13.3 G/DL (ref 12–16)
IMM GRANULOCYTES # BLD AUTO: 0 K/UL (ref 0–0.04)
IMM GRANULOCYTES NFR BLD AUTO: 0 % (ref 0–0.5)
LYMPHOCYTES # BLD: 2.6 K/UL (ref 0.9–3.6)
LYMPHOCYTES NFR BLD: 35 % (ref 21–52)
MCH RBC QN AUTO: 27.7 PG (ref 24–34)
MCHC RBC AUTO-ENTMCNC: 31.8 G/DL (ref 31–37)
MCV RBC AUTO: 86.9 FL (ref 78–100)
MONOCYTES # BLD: 0.6 K/UL (ref 0.05–1.2)
MONOCYTES NFR BLD: 9 % (ref 3–10)
NEUTS SEG # BLD: 3.7 K/UL (ref 1.8–8)
NEUTS SEG NFR BLD: 49 % (ref 40–73)
NRBC # BLD: 0 K/UL (ref 0–0.01)
NRBC BLD-RTO: 0 PER 100 WBC
PLATELET # BLD AUTO: 215 K/UL (ref 135–420)
PMV BLD AUTO: 11.6 FL (ref 9.2–11.8)
POTASSIUM SERPL-SCNC: 3.1 MMOL/L (ref 3.5–5.5)
PROT SERPL-MCNC: 8 G/DL (ref 6.4–8.2)
RBC # BLD AUTO: 4.81 M/UL (ref 4.2–5.3)
SODIUM SERPL-SCNC: 141 MMOL/L (ref 136–145)
TROPONIN I SERPL HS-MCNC: 27 NG/L (ref 0–54)
TROPONIN I SERPL HS-MCNC: 35 NG/L (ref 0–54)
WBC # BLD AUTO: 7.4 K/UL (ref 4.6–13.2)

## 2024-01-03 PROCEDURE — 93010 ELECTROCARDIOGRAM REPORT: CPT | Performed by: INTERNAL MEDICINE

## 2024-01-03 PROCEDURE — 99285 EMERGENCY DEPT VISIT HI MDM: CPT

## 2024-01-03 PROCEDURE — 80053 COMPREHEN METABOLIC PANEL: CPT

## 2024-01-03 PROCEDURE — 6370000000 HC RX 637 (ALT 250 FOR IP): Performed by: EMERGENCY MEDICINE

## 2024-01-03 PROCEDURE — 85025 COMPLETE CBC W/AUTO DIFF WBC: CPT

## 2024-01-03 PROCEDURE — 93005 ELECTROCARDIOGRAM TRACING: CPT | Performed by: EMERGENCY MEDICINE

## 2024-01-03 PROCEDURE — 70450 CT HEAD/BRAIN W/O DYE: CPT

## 2024-01-03 PROCEDURE — 71045 X-RAY EXAM CHEST 1 VIEW: CPT

## 2024-01-03 PROCEDURE — 84484 ASSAY OF TROPONIN QUANT: CPT

## 2024-01-03 RX ORDER — ACETAMINOPHEN 500 MG
1000 TABLET ORAL
Status: COMPLETED | OUTPATIENT
Start: 2024-01-03 | End: 2024-01-03

## 2024-01-03 RX ORDER — NITROGLYCERIN 0.4 MG/1
0.4 TABLET SUBLINGUAL ONCE
Status: COMPLETED | OUTPATIENT
Start: 2024-01-03 | End: 2024-01-03

## 2024-01-03 RX ADMIN — NITROGLYCERIN 0.4 MG: 0.4 TABLET, ORALLY DISINTEGRATING SUBLINGUAL at 15:22

## 2024-01-03 RX ADMIN — POTASSIUM BICARBONATE 40 MEQ: 782 TABLET, EFFERVESCENT ORAL at 16:12

## 2024-01-03 RX ADMIN — ACETAMINOPHEN 1000 MG: 500 TABLET ORAL at 16:12

## 2024-01-03 ASSESSMENT — PAIN SCALES - GENERAL
PAINLEVEL_OUTOF10: 10
PAINLEVEL_OUTOF10: 3

## 2024-01-03 ASSESSMENT — LIFESTYLE VARIABLES
HOW OFTEN DO YOU HAVE A DRINK CONTAINING ALCOHOL: 2-3 TIMES A WEEK
HOW MANY STANDARD DRINKS CONTAINING ALCOHOL DO YOU HAVE ON A TYPICAL DAY: 1 OR 2

## 2024-01-03 ASSESSMENT — PAIN DESCRIPTION - DESCRIPTORS: DESCRIPTORS: ACHING

## 2024-01-03 ASSESSMENT — PAIN - FUNCTIONAL ASSESSMENT: PAIN_FUNCTIONAL_ASSESSMENT: 0-10

## 2024-01-03 ASSESSMENT — PAIN DESCRIPTION - LOCATION: LOCATION: HEAD

## 2024-01-03 NOTE — ED PROVIDER NOTES
headache, recent stress anxiety from sudden loss of her brother..  Doubt acute coronary syndrome with negative delta troponin x 2, nonischemic ECG.  Also doubt subarachnoid hemorrhage based on CT imaging and clinical exam with no focal logic deficits, ongoing severe pain, vomiting, syncope.  Repeat blood pressure prior to discharge was 166/101 despite headache improved.  Headache is still present so I did advise her to retake her blood pressure tonight and take a second dose of her valsartan-hydrochlorothiazide if still remains elevated greater than 150/100.  No indication to acutely lower in the ED.  Doubt acute CVA with no focal neurologic deficits, speech abnormalities, vision change.  Counseled her to follow-up with primary care provider for ED follow-up and repeat blood pressure reading. Pt unfortunately vomited the Effer-K.  Advised her to eat fruit or take potassium chloride tablet daily for the next week.    Documentation/Prior Results Review:  Nursing notes    Social Determinants of Health: None identified    Is this patient to be included in the SEP-1 core measure due to severe sepsis or septic shock? No Exclusion criteria - the patient is NOT to be included for SEP-1 Core Measure due to: Infection is not suspected    The patient will be discharged home. The patient was reassured that these symptoms do not appear to represent a serious or life threatening condition at this time. Warning signs of worsening condition were discusses and understood by the patient. Based on patient's age, coexisting illness, exam and the results of this ED evaluation, the decision to treat as an outpatient was made. Based on the information available at time of discharge, acute pathology requiring immediate intervention was deemed relative unlikely. While it is impossible to completely exclude the possibility of underlying serious disease or worsening condition, I feel the relative likelihood is extremely low. I discussed this

## 2024-01-03 NOTE — ED TRIAGE NOTES
States /100, chest pressure and H/A x 35 minutes. States brother passed 2 days ago, under a lot of stress.

## 2024-01-03 NOTE — DISCHARGE INSTRUCTIONS
Can take an extra half half tablet to 1 extra tablet of your Hyzaar if blood pressure remains greater than 150/90 after waiting approximately 3 hours after taking.  If you are having a headache or pain to other areas, try Tylenol and/or ibuprofen first to see if this will lower your blood pressure.

## 2024-03-19 ENCOUNTER — HOSPITAL ENCOUNTER (OUTPATIENT)
Facility: HOSPITAL | Age: 59
Discharge: HOME | End: 2024-03-22
Payer: COMMERCIAL

## 2024-03-19 LAB
ALBUMIN SERPL-MCNC: 3.8 G/DL (ref 3.4–5)
ALBUMIN/GLOB SERPL: 0.9 (ref 0.8–1.7)
ALP SERPL-CCNC: 49 U/L (ref 45–117)
ALT SERPL-CCNC: 16 U/L (ref 13–56)
ANION GAP SERPL CALC-SCNC: 7 MMOL/L (ref 3–18)
AST SERPL-CCNC: 13 U/L (ref 10–38)
BILIRUB SERPL-MCNC: 0.6 MG/DL (ref 0.2–1)
BUN SERPL-MCNC: 10 MG/DL (ref 7–18)
BUN/CREAT SERPL: 13 (ref 12–20)
CALCIUM SERPL-MCNC: 9.5 MG/DL (ref 8.5–10.1)
CHLORIDE SERPL-SCNC: 104 MMOL/L (ref 100–111)
CHOLEST SERPL-MCNC: 226 MG/DL
CO2 SERPL-SCNC: 29 MMOL/L (ref 21–32)
CREAT SERPL-MCNC: 0.8 MG/DL (ref 0.6–1.3)
GLOBULIN SER CALC-MCNC: 4.1 G/DL (ref 2–4)
GLUCOSE SERPL-MCNC: 94 MG/DL (ref 74–99)
HBA1C MFR BLD: 6 % (ref 4.2–5.6)
HDLC SERPL-MCNC: 59 MG/DL (ref 40–60)
HDLC SERPL: 3.8 (ref 0–5)
LDLC SERPL CALC-MCNC: 140.8 MG/DL (ref 0–100)
LIPID PANEL: ABNORMAL
POTASSIUM SERPL-SCNC: 3.8 MMOL/L (ref 3.5–5.5)
PROT SERPL-MCNC: 7.9 G/DL (ref 6.4–8.2)
SODIUM SERPL-SCNC: 140 MMOL/L (ref 136–145)
TRIGL SERPL-MCNC: 131 MG/DL
TSH SERPL DL<=0.05 MIU/L-ACNC: 1.5 UIU/ML (ref 0.36–3.74)
VLDLC SERPL CALC-MCNC: 26.2 MG/DL

## 2024-03-19 PROCEDURE — 36415 COLL VENOUS BLD VENIPUNCTURE: CPT

## 2024-03-19 PROCEDURE — 83036 HEMOGLOBIN GLYCOSYLATED A1C: CPT

## 2024-03-19 PROCEDURE — 84443 ASSAY THYROID STIM HORMONE: CPT

## 2024-03-19 PROCEDURE — 80061 LIPID PANEL: CPT

## 2024-03-19 PROCEDURE — 80053 COMPREHEN METABOLIC PANEL: CPT

## 2024-11-11 ENCOUNTER — TRANSCRIBE ORDERS (OUTPATIENT)
Facility: HOSPITAL | Age: 59
End: 2024-11-11

## 2024-11-11 DIAGNOSIS — Z12.31 VISIT FOR SCREENING MAMMOGRAM: Primary | ICD-10-CM

## 2024-11-26 ENCOUNTER — HOSPITAL ENCOUNTER (OUTPATIENT)
Facility: HOSPITAL | Age: 59
Discharge: HOME | End: 2024-11-29

## 2024-11-26 LAB — LABCORP SPECIMEN COLLECTION: NORMAL

## 2024-11-26 PROCEDURE — 99001 SPECIMEN HANDLING PT-LAB: CPT

## 2025-05-01 ENCOUNTER — HOSPITAL ENCOUNTER (OUTPATIENT)
Facility: HOSPITAL | Age: 60
Discharge: HOME | End: 2025-05-01
Payer: COMMERCIAL

## 2025-05-01 VITALS — BODY MASS INDEX: 32.14 KG/M2 | HEIGHT: 69 IN | WEIGHT: 217 LBS

## 2025-05-01 DIAGNOSIS — Z12.31 VISIT FOR SCREENING MAMMOGRAM: ICD-10-CM

## 2025-05-01 PROCEDURE — 77063 BREAST TOMOSYNTHESIS BI: CPT
